# Patient Record
Sex: FEMALE | Race: WHITE | URBAN - METROPOLITAN AREA
[De-identification: names, ages, dates, MRNs, and addresses within clinical notes are randomized per-mention and may not be internally consistent; named-entity substitution may affect disease eponyms.]

---

## 2019-03-07 ENCOUNTER — APPOINTMENT (RX ONLY)
Dept: URBAN - METROPOLITAN AREA CLINIC 35 | Facility: CLINIC | Age: 64
Setting detail: DERMATOLOGY
End: 2019-03-07

## 2019-03-07 DIAGNOSIS — Z86.007 PERSONAL HISTORY OF IN-SITU NEOPLASM OF SKIN: ICD-10-CM

## 2019-03-07 DIAGNOSIS — D22 MELANOCYTIC NEVI: ICD-10-CM

## 2019-03-07 DIAGNOSIS — L82.1 OTHER SEBORRHEIC KERATOSIS: ICD-10-CM

## 2019-03-07 DIAGNOSIS — Z71.89 OTHER SPECIFIED COUNSELING: ICD-10-CM

## 2019-03-07 DIAGNOSIS — L81.4 OTHER MELANIN HYPERPIGMENTATION: ICD-10-CM

## 2019-03-07 DIAGNOSIS — Z87.2 PERSONAL HISTORY OF DISEASES OF THE SKIN AND SUBCUTANEOUS TISSUE: ICD-10-CM

## 2019-03-07 PROBLEM — I10 ESSENTIAL (PRIMARY) HYPERTENSION: Status: ACTIVE | Noted: 2019-03-07

## 2019-03-07 PROBLEM — D22.61 MELANOCYTIC NEVI OF RIGHT UPPER LIMB, INCLUDING SHOULDER: Status: ACTIVE | Noted: 2019-03-07

## 2019-03-07 PROBLEM — Z85.828 PERSONAL HISTORY OF OTHER MALIGNANT NEOPLASM OF SKIN: Status: ACTIVE | Noted: 2019-03-07

## 2019-03-07 PROBLEM — D22.71 MELANOCYTIC NEVI OF RIGHT LOWER LIMB, INCLUDING HIP: Status: ACTIVE | Noted: 2019-03-07

## 2019-03-07 PROCEDURE — 99213 OFFICE O/P EST LOW 20 MIN: CPT

## 2019-03-07 PROCEDURE — ? COUNSELING

## 2019-03-07 ASSESSMENT — LOCATION SIMPLE DESCRIPTION DERM
LOCATION SIMPLE: LEFT UPPER BACK
LOCATION SIMPLE: RIGHT SHOULDER
LOCATION SIMPLE: RIGHT UPPER BACK
LOCATION SIMPLE: RIGHT PLANTAR SURFACE

## 2019-03-07 ASSESSMENT — LOCATION ZONE DERM
LOCATION ZONE: TRUNK
LOCATION ZONE: ARM
LOCATION ZONE: FEET

## 2019-03-07 ASSESSMENT — LOCATION DETAILED DESCRIPTION DERM
LOCATION DETAILED: RIGHT SUPERIOR UPPER BACK
LOCATION DETAILED: RIGHT POSTERIOR SHOULDER
LOCATION DETAILED: LEFT SUPERIOR MEDIAL UPPER BACK
LOCATION DETAILED: RIGHT PLANTAR FOREFOOT OVERLYING 1ST METATARSAL

## 2020-01-17 ENCOUNTER — HOSPITAL ENCOUNTER (OUTPATIENT)
Dept: HOSPITAL 8 - RAD | Age: 65
Discharge: HOME | End: 2020-01-17
Attending: PHYSICIAN ASSISTANT
Payer: COMMERCIAL

## 2020-01-17 DIAGNOSIS — Z95.810: ICD-10-CM

## 2020-01-17 DIAGNOSIS — R00.1: ICD-10-CM

## 2020-01-17 DIAGNOSIS — J98.4: Primary | ICD-10-CM

## 2020-01-17 DIAGNOSIS — E78.2: ICD-10-CM

## 2020-01-17 DIAGNOSIS — I42.9: ICD-10-CM

## 2020-01-17 PROCEDURE — 71046 X-RAY EXAM CHEST 2 VIEWS: CPT

## 2020-03-11 ENCOUNTER — APPOINTMENT (RX ONLY)
Dept: URBAN - METROPOLITAN AREA CLINIC 35 | Facility: CLINIC | Age: 65
Setting detail: DERMATOLOGY
End: 2020-03-11

## 2020-03-11 DIAGNOSIS — H61.03 CHONDRITIS OF EXTERNAL EAR: ICD-10-CM

## 2020-03-11 DIAGNOSIS — Z86.007 PERSONAL HISTORY OF IN-SITU NEOPLASM OF SKIN: ICD-10-CM

## 2020-03-11 DIAGNOSIS — L81.4 OTHER MELANIN HYPERPIGMENTATION: ICD-10-CM

## 2020-03-11 DIAGNOSIS — Z71.89 OTHER SPECIFIED COUNSELING: ICD-10-CM

## 2020-03-11 DIAGNOSIS — D22 MELANOCYTIC NEVI: ICD-10-CM

## 2020-03-11 DIAGNOSIS — L82.1 OTHER SEBORRHEIC KERATOSIS: ICD-10-CM

## 2020-03-11 DIAGNOSIS — Z87.2 PERSONAL HISTORY OF DISEASES OF THE SKIN AND SUBCUTANEOUS TISSUE: ICD-10-CM

## 2020-03-11 PROBLEM — Z85.828 PERSONAL HISTORY OF OTHER MALIGNANT NEOPLASM OF SKIN: Status: ACTIVE | Noted: 2020-03-11

## 2020-03-11 PROBLEM — D22.9 MELANOCYTIC NEVI, UNSPECIFIED: Status: ACTIVE | Noted: 2020-03-11

## 2020-03-11 PROBLEM — H61.032 CHONDRITIS OF LEFT EXTERNAL EAR: Status: ACTIVE | Noted: 2020-03-11

## 2020-03-11 PROCEDURE — 99213 OFFICE O/P EST LOW 20 MIN: CPT

## 2020-03-11 PROCEDURE — ? COUNSELING

## 2020-03-11 ASSESSMENT — LOCATION ZONE DERM
LOCATION ZONE: EAR
LOCATION ZONE: TRUNK

## 2020-03-11 ASSESSMENT — LOCATION SIMPLE DESCRIPTION DERM
LOCATION SIMPLE: LEFT EAR
LOCATION SIMPLE: LEFT UPPER BACK
LOCATION SIMPLE: RIGHT UPPER BACK

## 2020-03-11 ASSESSMENT — LOCATION DETAILED DESCRIPTION DERM
LOCATION DETAILED: LEFT INFERIOR HELIX
LOCATION DETAILED: LEFT SUPERIOR MEDIAL UPPER BACK
LOCATION DETAILED: RIGHT SUPERIOR UPPER BACK

## 2020-03-14 ENCOUNTER — HOSPITAL ENCOUNTER (OUTPATIENT)
Dept: HOSPITAL 8 - LAB | Age: 65
Discharge: HOME | End: 2020-03-14
Attending: PHYSICIAN ASSISTANT
Payer: COMMERCIAL

## 2020-03-14 DIAGNOSIS — E04.1: ICD-10-CM

## 2020-03-14 DIAGNOSIS — I42.9: ICD-10-CM

## 2020-03-14 DIAGNOSIS — E78.5: ICD-10-CM

## 2020-03-14 DIAGNOSIS — C44.92: ICD-10-CM

## 2020-03-14 DIAGNOSIS — Z12.4: Primary | ICD-10-CM

## 2020-03-14 DIAGNOSIS — M81.8: ICD-10-CM

## 2020-03-14 DIAGNOSIS — Z13.220: ICD-10-CM

## 2020-03-14 DIAGNOSIS — J45.909: ICD-10-CM

## 2020-03-14 DIAGNOSIS — K21.9: ICD-10-CM

## 2020-03-14 DIAGNOSIS — R73.01: ICD-10-CM

## 2020-03-14 LAB
ALBUMIN SERPL-MCNC: 3.6 G/DL (ref 3.4–5)
ALP SERPL-CCNC: 78 U/L (ref 45–117)
ALT SERPL-CCNC: 23 U/L (ref 12–78)
ANION GAP SERPL CALC-SCNC: 7 MMOL/L (ref 5–15)
BASOPHILS # BLD AUTO: 0.08 X10^3/UL (ref 0–0.1)
BASOPHILS NFR BLD AUTO: 1 % (ref 0–1)
BILIRUB SERPL-MCNC: 0.8 MG/DL (ref 0.2–1)
CALCIUM SERPL-MCNC: 8.6 MG/DL (ref 8.5–10.1)
CHLORIDE SERPL-SCNC: 107 MMOL/L (ref 98–107)
CHOL/HDL RATIO: 3.6
CREAT SERPL-MCNC: 0.84 MG/DL (ref 0.55–1.02)
EOSINOPHIL # BLD AUTO: 0.12 X10^3/UL (ref 0–0.4)
EOSINOPHIL NFR BLD AUTO: 1 % (ref 1–7)
ERYTHROCYTE [DISTWIDTH] IN BLOOD BY AUTOMATED COUNT: 13.3 % (ref 9.6–15.2)
EST. AVERAGE GLUCOSE BLD GHB EST-MCNC: 131 MG/DL (ref 0–126)
HDL CHOL %: 28 % (ref 28–40)
HDL CHOLESTEROL (DIRECT): 46 MG/DL (ref 40–60)
LDL CHOLESTEROL,CALCULATED: 91 MG/DL (ref 54–169)
LDLC/HDLC SERPL: 2 {RATIO} (ref 0.5–3)
LYMPHOCYTES # BLD AUTO: 1.92 X10^3/UL (ref 1–3.4)
LYMPHOCYTES NFR BLD AUTO: 22 % (ref 22–44)
MCH RBC QN AUTO: 31.2 PG (ref 27–34.8)
MCHC RBC AUTO-ENTMCNC: 33.4 G/DL (ref 32.4–35.8)
MCV RBC AUTO: 93.4 FL (ref 80–100)
MD: NO
MONOCYTES # BLD AUTO: 0.67 X10^3/UL (ref 0.2–0.8)
MONOCYTES NFR BLD AUTO: 8 % (ref 2–9)
NEUTROPHILS # BLD AUTO: 5.78 X10^3/UL (ref 1.8–6.8)
NEUTROPHILS NFR BLD AUTO: 67 % (ref 42–75)
PLATELET # BLD AUTO: 311 X10^3/UL (ref 130–400)
PMV BLD AUTO: 8.4 FL (ref 7.4–10.4)
PROT SERPL-MCNC: 7.1 G/DL (ref 6.4–8.2)
RBC # BLD AUTO: 4.77 X10^6/UL (ref 3.82–5.3)
T4 (THYROXINE): 11.2 MCG/DL (ref 4.8–13.9)
TRIGL SERPL-MCNC: 133 MG/DL (ref 50–200)
VLDLC SERPL CALC-MCNC: 27 MG/DL (ref 0–25)

## 2020-03-14 PROCEDURE — 36415 COLL VENOUS BLD VENIPUNCTURE: CPT

## 2020-03-14 PROCEDURE — 85025 COMPLETE CBC W/AUTO DIFF WBC: CPT

## 2020-03-14 PROCEDURE — 84443 ASSAY THYROID STIM HORMONE: CPT

## 2020-03-14 PROCEDURE — 83036 HEMOGLOBIN GLYCOSYLATED A1C: CPT

## 2020-03-14 PROCEDURE — 84481 FREE ASSAY (FT-3): CPT

## 2020-03-14 PROCEDURE — 80061 LIPID PANEL: CPT

## 2020-03-14 PROCEDURE — 84436 ASSAY OF TOTAL THYROXINE: CPT

## 2020-03-14 PROCEDURE — 80053 COMPREHEN METABOLIC PANEL: CPT

## 2020-04-17 ENCOUNTER — HOSPITAL ENCOUNTER (OUTPATIENT)
Dept: HOSPITAL 8 - CVU | Age: 65
Discharge: HOME | End: 2020-04-17
Attending: INTERNAL MEDICINE
Payer: COMMERCIAL

## 2020-04-17 DIAGNOSIS — I42.9: ICD-10-CM

## 2020-04-17 DIAGNOSIS — I08.3: Primary | ICD-10-CM

## 2020-04-17 PROCEDURE — 93306 TTE W/DOPPLER COMPLETE: CPT

## 2020-08-06 ENCOUNTER — HOSPITAL ENCOUNTER (OUTPATIENT)
Dept: HOSPITAL 8 - LAB | Age: 65
Discharge: HOME | End: 2020-08-06
Attending: INTERNAL MEDICINE
Payer: COMMERCIAL

## 2020-08-06 DIAGNOSIS — I50.9: Primary | ICD-10-CM

## 2020-08-06 LAB
ANION GAP SERPL CALC-SCNC: 7 MMOL/L (ref 5–15)
CALCIUM SERPL-MCNC: 8.5 MG/DL (ref 8.5–10.1)
CHLORIDE SERPL-SCNC: 110 MMOL/L (ref 98–107)
CREAT SERPL-MCNC: 0.77 MG/DL (ref 0.55–1.02)

## 2020-08-06 PROCEDURE — 36415 COLL VENOUS BLD VENIPUNCTURE: CPT

## 2020-08-06 PROCEDURE — 80048 BASIC METABOLIC PNL TOTAL CA: CPT

## 2020-09-12 ENCOUNTER — HOSPITAL ENCOUNTER (OUTPATIENT)
Dept: HOSPITAL 8 - LAB | Age: 65
Discharge: HOME | End: 2020-09-12
Attending: INTERNAL MEDICINE
Payer: COMMERCIAL

## 2020-09-12 DIAGNOSIS — I42.9: ICD-10-CM

## 2020-09-12 DIAGNOSIS — R73.01: ICD-10-CM

## 2020-09-12 DIAGNOSIS — Z12.11: ICD-10-CM

## 2020-09-12 DIAGNOSIS — Z13.220: Primary | ICD-10-CM

## 2020-09-12 DIAGNOSIS — E04.1: ICD-10-CM

## 2020-09-12 DIAGNOSIS — C44.92: ICD-10-CM

## 2020-09-12 DIAGNOSIS — Z12.39: ICD-10-CM

## 2020-09-12 DIAGNOSIS — J45.909: ICD-10-CM

## 2020-09-12 DIAGNOSIS — K21.9: ICD-10-CM

## 2020-09-12 DIAGNOSIS — E78.5: ICD-10-CM

## 2020-09-12 DIAGNOSIS — Z12.4: ICD-10-CM

## 2020-09-12 DIAGNOSIS — M81.8: ICD-10-CM

## 2020-09-12 LAB
ALBUMIN SERPL-MCNC: 3.6 G/DL (ref 3.4–5)
ALP SERPL-CCNC: 68 U/L (ref 45–117)
ALT SERPL-CCNC: 21 U/L (ref 12–78)
ANION GAP SERPL CALC-SCNC: 4 MMOL/L (ref 5–15)
BASOPHILS # BLD AUTO: 0.02 X10^3/UL (ref 0–0.1)
BASOPHILS NFR BLD AUTO: 0 % (ref 0–1)
BILIRUB SERPL-MCNC: 0.8 MG/DL (ref 0.2–1)
CALCIUM SERPL-MCNC: 9 MG/DL (ref 8.5–10.1)
CHLORIDE SERPL-SCNC: 110 MMOL/L (ref 98–107)
CHOL/HDL RATIO: 3.2
CREAT SERPL-MCNC: 0.79 MG/DL (ref 0.55–1.02)
EOSINOPHIL # BLD AUTO: 0.14 X10^3/UL (ref 0–0.4)
EOSINOPHIL NFR BLD AUTO: 2 % (ref 1–7)
ERYTHROCYTE [DISTWIDTH] IN BLOOD BY AUTOMATED COUNT: 13.1 % (ref 9.6–15.2)
EST. AVERAGE GLUCOSE BLD GHB EST-MCNC: 123 MG/DL (ref 0–126)
HDL CHOL %: 31 % (ref 28–40)
HDL CHOLESTEROL (DIRECT): 45 MG/DL (ref 40–60)
LDL CHOLESTEROL,CALCULATED: 66 MG/DL (ref 54–169)
LDLC/HDLC SERPL: 1.5 {RATIO} (ref 0.5–3)
LYMPHOCYTES # BLD AUTO: 1.95 X10^3/UL (ref 1–3.4)
LYMPHOCYTES NFR BLD AUTO: 21 % (ref 22–44)
MCH RBC QN AUTO: 30.9 PG (ref 27–34.8)
MCHC RBC AUTO-ENTMCNC: 33.2 G/DL (ref 32.4–35.8)
MCV RBC AUTO: 93.1 FL (ref 80–100)
MD: NO
MONOCYTES # BLD AUTO: 0.55 X10^3/UL (ref 0.2–0.8)
MONOCYTES NFR BLD AUTO: 6 % (ref 2–9)
NEUTROPHILS # BLD AUTO: 6.75 X10^3/UL (ref 1.8–6.8)
NEUTROPHILS NFR BLD AUTO: 72 % (ref 42–75)
PLATELET # BLD AUTO: 279 X10^3/UL (ref 130–400)
PMV BLD AUTO: 8.4 FL (ref 7.4–10.4)
PROT SERPL-MCNC: 7 G/DL (ref 6.4–8.2)
RBC # BLD AUTO: 4.59 X10^6/UL (ref 3.82–5.3)
TRIGL SERPL-MCNC: 176 MG/DL (ref 50–200)
VLDLC SERPL CALC-MCNC: 35 MG/DL (ref 0–25)

## 2020-09-12 PROCEDURE — 80053 COMPREHEN METABOLIC PANEL: CPT

## 2020-09-12 PROCEDURE — 84443 ASSAY THYROID STIM HORMONE: CPT

## 2020-09-12 PROCEDURE — 80061 LIPID PANEL: CPT

## 2020-09-12 PROCEDURE — 85025 COMPLETE CBC W/AUTO DIFF WBC: CPT

## 2020-09-12 PROCEDURE — 83036 HEMOGLOBIN GLYCOSYLATED A1C: CPT

## 2020-09-12 PROCEDURE — 36415 COLL VENOUS BLD VENIPUNCTURE: CPT

## 2020-10-12 ENCOUNTER — HOSPITAL ENCOUNTER (OUTPATIENT)
Dept: HOSPITAL 8 - RAD | Age: 65
Discharge: HOME | End: 2020-10-12
Attending: INTERNAL MEDICINE
Payer: COMMERCIAL

## 2020-10-12 DIAGNOSIS — R60.9: ICD-10-CM

## 2020-10-12 DIAGNOSIS — M79.662: ICD-10-CM

## 2020-10-12 DIAGNOSIS — I82.409: Primary | ICD-10-CM

## 2020-10-19 ENCOUNTER — HOSPITAL ENCOUNTER (OUTPATIENT)
Dept: HOSPITAL 8 - LAB | Age: 65
Discharge: HOME | End: 2020-10-19
Attending: INTERNAL MEDICINE
Payer: COMMERCIAL

## 2020-10-19 DIAGNOSIS — Z95.810: ICD-10-CM

## 2020-10-19 DIAGNOSIS — E78.2: Primary | ICD-10-CM

## 2020-10-19 DIAGNOSIS — I42.9: ICD-10-CM

## 2020-10-19 LAB
ANION GAP SERPL CALC-SCNC: 5 MMOL/L (ref 5–15)
CALCIUM SERPL-MCNC: 9 MG/DL (ref 8.5–10.1)
CHLORIDE SERPL-SCNC: 106 MMOL/L (ref 98–107)
CREAT SERPL-MCNC: 0.69 MG/DL (ref 0.55–1.02)

## 2020-10-19 PROCEDURE — 80048 BASIC METABOLIC PNL TOTAL CA: CPT

## 2020-10-19 PROCEDURE — 36415 COLL VENOUS BLD VENIPUNCTURE: CPT

## 2020-12-11 ENCOUNTER — HOSPITAL ENCOUNTER (OUTPATIENT)
Dept: HOSPITAL 8 - LAB | Age: 65
Discharge: HOME | End: 2020-12-11
Attending: INTERNAL MEDICINE
Payer: COMMERCIAL

## 2020-12-11 DIAGNOSIS — Z13.220: Primary | ICD-10-CM

## 2020-12-11 DIAGNOSIS — M81.8: ICD-10-CM

## 2020-12-11 DIAGNOSIS — I42.9: ICD-10-CM

## 2020-12-11 DIAGNOSIS — Z12.4: ICD-10-CM

## 2020-12-11 DIAGNOSIS — Z23: ICD-10-CM

## 2020-12-11 DIAGNOSIS — C44.92: ICD-10-CM

## 2020-12-11 DIAGNOSIS — K21.9: ICD-10-CM

## 2020-12-11 DIAGNOSIS — Z12.11: ICD-10-CM

## 2020-12-11 DIAGNOSIS — R73.01: ICD-10-CM

## 2020-12-11 DIAGNOSIS — E04.1: ICD-10-CM

## 2020-12-11 DIAGNOSIS — E78.5: ICD-10-CM

## 2020-12-11 DIAGNOSIS — J45.909: ICD-10-CM

## 2020-12-11 LAB
ANION GAP SERPL CALC-SCNC: 4 MMOL/L (ref 5–15)
CALCIUM SERPL-MCNC: 9 MG/DL (ref 8.5–10.1)
CHLORIDE SERPL-SCNC: 106 MMOL/L (ref 98–107)
CREAT SERPL-MCNC: 0.9 MG/DL (ref 0.55–1.02)

## 2020-12-11 PROCEDURE — 36415 COLL VENOUS BLD VENIPUNCTURE: CPT

## 2020-12-11 PROCEDURE — 80048 BASIC METABOLIC PNL TOTAL CA: CPT

## 2020-12-28 ENCOUNTER — HOSPITAL ENCOUNTER (OUTPATIENT)
Dept: HOSPITAL 8 - CFH | Age: 65
Discharge: HOME | End: 2020-12-28
Attending: INTERNAL MEDICINE
Payer: COMMERCIAL

## 2020-12-28 DIAGNOSIS — N63.20: ICD-10-CM

## 2020-12-28 DIAGNOSIS — Z12.31: Primary | ICD-10-CM

## 2020-12-28 DIAGNOSIS — N63.10: ICD-10-CM

## 2020-12-28 PROCEDURE — 77063 BREAST TOMOSYNTHESIS BI: CPT

## 2020-12-28 PROCEDURE — 77067 SCR MAMMO BI INCL CAD: CPT

## 2021-01-29 ENCOUNTER — HOSPITAL ENCOUNTER (OUTPATIENT)
Dept: HOSPITAL 8 - RAD | Age: 66
Discharge: HOME | End: 2021-01-29
Attending: INTERNAL MEDICINE
Payer: COMMERCIAL

## 2021-01-29 DIAGNOSIS — I42.9: ICD-10-CM

## 2021-01-29 DIAGNOSIS — E78.2: Primary | ICD-10-CM

## 2021-01-29 DIAGNOSIS — I51.7: ICD-10-CM

## 2021-01-29 DIAGNOSIS — R60.9: ICD-10-CM

## 2021-01-29 DIAGNOSIS — I49.3: ICD-10-CM

## 2021-01-29 DIAGNOSIS — J84.10: ICD-10-CM

## 2021-01-29 DIAGNOSIS — Z95.810: ICD-10-CM

## 2021-01-29 PROCEDURE — 71046 X-RAY EXAM CHEST 2 VIEWS: CPT

## 2021-02-25 ENCOUNTER — HOSPITAL ENCOUNTER (OUTPATIENT)
Dept: HOSPITAL 8 - CVU | Age: 66
Discharge: HOME | End: 2021-02-25
Attending: INTERNAL MEDICINE
Payer: COMMERCIAL

## 2021-02-25 DIAGNOSIS — I35.1: Primary | ICD-10-CM

## 2021-02-25 DIAGNOSIS — I86.8: ICD-10-CM

## 2021-02-25 DIAGNOSIS — I42.9: ICD-10-CM

## 2021-02-25 PROCEDURE — 93306 TTE W/DOPPLER COMPLETE: CPT

## 2021-02-25 PROCEDURE — 93970 EXTREMITY STUDY: CPT

## 2021-03-03 DIAGNOSIS — Z23 NEED FOR VACCINATION: ICD-10-CM

## 2021-03-10 ENCOUNTER — HOSPITAL ENCOUNTER (OUTPATIENT)
Dept: HOSPITAL 8 - RAD | Age: 66
Discharge: HOME | End: 2021-03-10
Attending: NURSE PRACTITIONER
Payer: COMMERCIAL

## 2021-03-10 DIAGNOSIS — E04.2: Primary | ICD-10-CM

## 2021-03-10 PROCEDURE — 76536 US EXAM OF HEAD AND NECK: CPT

## 2021-03-11 ENCOUNTER — APPOINTMENT (RX ONLY)
Dept: URBAN - METROPOLITAN AREA CLINIC 35 | Facility: CLINIC | Age: 66
Setting detail: DERMATOLOGY
End: 2021-03-11

## 2021-03-11 DIAGNOSIS — Z71.89 OTHER SPECIFIED COUNSELING: ICD-10-CM

## 2021-03-11 DIAGNOSIS — L82.0 INFLAMED SEBORRHEIC KERATOSIS: ICD-10-CM

## 2021-03-11 DIAGNOSIS — Z87.2 PERSONAL HISTORY OF DISEASES OF THE SKIN AND SUBCUTANEOUS TISSUE: ICD-10-CM

## 2021-03-11 DIAGNOSIS — L81.4 OTHER MELANIN HYPERPIGMENTATION: ICD-10-CM

## 2021-03-11 DIAGNOSIS — D22 MELANOCYTIC NEVI: ICD-10-CM

## 2021-03-11 DIAGNOSIS — Z86.007 PERSONAL HISTORY OF IN-SITU NEOPLASM OF SKIN: ICD-10-CM

## 2021-03-11 DIAGNOSIS — L72.0 EPIDERMAL CYST: ICD-10-CM

## 2021-03-11 DIAGNOSIS — L82.1 OTHER SEBORRHEIC KERATOSIS: ICD-10-CM

## 2021-03-11 DIAGNOSIS — D485 NEOPLASM OF UNCERTAIN BEHAVIOR OF SKIN: ICD-10-CM

## 2021-03-11 PROBLEM — Z85.828 PERSONAL HISTORY OF OTHER MALIGNANT NEOPLASM OF SKIN: Status: ACTIVE | Noted: 2021-03-11

## 2021-03-11 PROBLEM — D48.5 NEOPLASM OF UNCERTAIN BEHAVIOR OF SKIN: Status: ACTIVE | Noted: 2021-03-11

## 2021-03-11 PROBLEM — D22.5 MELANOCYTIC NEVI OF TRUNK: Status: ACTIVE | Noted: 2021-03-11

## 2021-03-11 PROCEDURE — 99213 OFFICE O/P EST LOW 20 MIN: CPT | Mod: 25

## 2021-03-11 PROCEDURE — ? COUNSELING

## 2021-03-11 PROCEDURE — 69100 BIOPSY OF EXTERNAL EAR: CPT

## 2021-03-11 PROCEDURE — ? BIOPSY BY SHAVE METHOD

## 2021-03-11 ASSESSMENT — LOCATION DETAILED DESCRIPTION DERM
LOCATION DETAILED: RIGHT MID-UPPER BACK
LOCATION DETAILED: RIGHT CLAVICULAR NECK
LOCATION DETAILED: LEFT CLAVICULAR NECK
LOCATION DETAILED: RIGHT SUPERIOR UPPER BACK
LOCATION DETAILED: LEFT SUPERIOR MEDIAL UPPER BACK
LOCATION DETAILED: LEFT ANTIHELIX
LOCATION DETAILED: XIPHOID

## 2021-03-11 ASSESSMENT — LOCATION SIMPLE DESCRIPTION DERM
LOCATION SIMPLE: LEFT UPPER BACK
LOCATION SIMPLE: RIGHT UPPER BACK
LOCATION SIMPLE: LEFT EAR
LOCATION SIMPLE: RIGHT ANTERIOR NECK
LOCATION SIMPLE: ABDOMEN
LOCATION SIMPLE: LEFT ANTERIOR NECK

## 2021-03-11 ASSESSMENT — LOCATION ZONE DERM
LOCATION ZONE: TRUNK
LOCATION ZONE: EAR
LOCATION ZONE: NECK

## 2021-03-13 ENCOUNTER — HOSPITAL ENCOUNTER (OUTPATIENT)
Dept: HOSPITAL 8 - LAB | Age: 66
Discharge: HOME | End: 2021-03-13
Attending: INTERNAL MEDICINE
Payer: COMMERCIAL

## 2021-03-13 DIAGNOSIS — Z23: ICD-10-CM

## 2021-03-13 DIAGNOSIS — E78.2: ICD-10-CM

## 2021-03-13 DIAGNOSIS — R60.9: ICD-10-CM

## 2021-03-13 DIAGNOSIS — Z95.810: ICD-10-CM

## 2021-03-13 DIAGNOSIS — E04.1: ICD-10-CM

## 2021-03-13 DIAGNOSIS — E11.9: ICD-10-CM

## 2021-03-13 DIAGNOSIS — Z13.220: Primary | ICD-10-CM

## 2021-03-13 DIAGNOSIS — I49.3: ICD-10-CM

## 2021-03-13 DIAGNOSIS — I42.9: ICD-10-CM

## 2021-03-13 DIAGNOSIS — M81.8: ICD-10-CM

## 2021-03-13 DIAGNOSIS — R07.9: ICD-10-CM

## 2021-03-13 DIAGNOSIS — I10: ICD-10-CM

## 2021-03-13 DIAGNOSIS — Z12.4: ICD-10-CM

## 2021-03-13 DIAGNOSIS — E78.5: ICD-10-CM

## 2021-03-13 DIAGNOSIS — J45.909: ICD-10-CM

## 2021-03-13 DIAGNOSIS — K21.9: ICD-10-CM

## 2021-03-13 DIAGNOSIS — C44.92: ICD-10-CM

## 2021-03-13 LAB
ALBUMIN SERPL-MCNC: 3.8 G/DL (ref 3.4–5)
ALP SERPL-CCNC: 67 U/L (ref 45–117)
ALT SERPL-CCNC: 21 U/L (ref 12–78)
ANION GAP SERPL CALC-SCNC: 5 MMOL/L (ref 5–15)
BASOPHILS # BLD AUTO: 0.1 X10^3/UL (ref 0–0.1)
BASOPHILS NFR BLD AUTO: 1 % (ref 0–1)
BILIRUB SERPL-MCNC: 0.9 MG/DL (ref 0.2–1)
CALCIUM SERPL-MCNC: 8.6 MG/DL (ref 8.5–10.1)
CHLORIDE SERPL-SCNC: 107 MMOL/L (ref 98–107)
CHOL/HDL RATIO: 3.4
CREAT SERPL-MCNC: 0.86 MG/DL (ref 0.55–1.02)
EOSINOPHIL # BLD AUTO: 0.1 X10^3/UL (ref 0–0.4)
EOSINOPHIL NFR BLD AUTO: 1 % (ref 1–7)
ERYTHROCYTE [DISTWIDTH] IN BLOOD BY AUTOMATED COUNT: 13.1 % (ref 9.6–15.2)
EST. AVERAGE GLUCOSE BLD GHB EST-MCNC: 131 MG/DL (ref 0–126)
HDL CHOL %: 30 % (ref 28–40)
HDL CHOLESTEROL (DIRECT): 44 MG/DL (ref 40–60)
LDL CHOLESTEROL,CALCULATED: 65 MG/DL (ref 54–169)
LDLC/HDLC SERPL: 1.5 {RATIO} (ref 0.5–3)
LYMPHOCYTES # BLD AUTO: 1.9 X10^3/UL (ref 1–3.4)
LYMPHOCYTES NFR BLD AUTO: 19 % (ref 22–44)
MCH RBC QN AUTO: 32 PG (ref 27–34.8)
MCHC RBC AUTO-ENTMCNC: 34.2 G/DL (ref 32.4–35.8)
MD: NO
MONOCYTES # BLD AUTO: 0.8 X10^3/UL (ref 0.2–0.8)
MONOCYTES NFR BLD AUTO: 8 % (ref 2–9)
NEUTROPHILS # BLD AUTO: 7.2 X10^3/UL (ref 1.8–6.8)
NEUTROPHILS NFR BLD AUTO: 71 % (ref 42–75)
PLATELET # BLD AUTO: 318 X10^3/UL (ref 130–400)
PMV BLD AUTO: 8.4 FL (ref 7.4–10.4)
PROT SERPL-MCNC: 7.3 G/DL (ref 6.4–8.2)
RBC # BLD AUTO: 4.33 X10^6/UL (ref 3.82–5.3)
TRIGL SERPL-MCNC: 198 MG/DL (ref 50–200)
VLDLC SERPL CALC-MCNC: 40 MG/DL (ref 0–25)

## 2021-03-13 PROCEDURE — 83036 HEMOGLOBIN GLYCOSYLATED A1C: CPT

## 2021-03-13 PROCEDURE — 80061 LIPID PANEL: CPT

## 2021-03-13 PROCEDURE — 85025 COMPLETE CBC W/AUTO DIFF WBC: CPT

## 2021-03-13 PROCEDURE — 84443 ASSAY THYROID STIM HORMONE: CPT

## 2021-03-13 PROCEDURE — 83880 ASSAY OF NATRIURETIC PEPTIDE: CPT

## 2021-03-13 PROCEDURE — 36415 COLL VENOUS BLD VENIPUNCTURE: CPT

## 2021-03-13 PROCEDURE — 80053 COMPREHEN METABOLIC PANEL: CPT

## 2021-03-15 ENCOUNTER — APPOINTMENT (RX ONLY)
Dept: URBAN - METROPOLITAN AREA CLINIC 35 | Facility: CLINIC | Age: 66
Setting detail: DERMATOLOGY
End: 2021-03-15

## 2021-03-15 DIAGNOSIS — L82.1 OTHER SEBORRHEIC KERATOSIS: ICD-10-CM

## 2021-03-15 PROCEDURE — ? BENIGN DESTRUCTION COSMETIC

## 2021-03-15 ASSESSMENT — LOCATION DETAILED DESCRIPTION DERM
LOCATION DETAILED: RIGHT CLAVICULAR NECK
LOCATION DETAILED: RIGHT INFERIOR LATERAL NECK
LOCATION DETAILED: LEFT CLAVICULAR NECK
LOCATION DETAILED: LEFT INFERIOR LATERAL NECK

## 2021-03-15 ASSESSMENT — LOCATION ZONE DERM: LOCATION ZONE: NECK

## 2021-03-15 ASSESSMENT — LOCATION SIMPLE DESCRIPTION DERM
LOCATION SIMPLE: RIGHT ANTERIOR NECK
LOCATION SIMPLE: LEFT ANTERIOR NECK

## 2021-03-15 NOTE — PROCEDURE: BENIGN DESTRUCTION COSMETIC
Anesthesia Volume In Cc: 1
Detail Level: Simple
Anesthesia Type: 0.5% lidocaine with 1:200,000 epinephrine and a 1:10 solution of 8.4% sodium bicarbonate
Post-Care Instructions: I reviewed with the patient in detail post-care instructions. Patient is to wear sunprotection, and avoid picking at any of the treated lesions. Pt may apply Vaseline to crusted or scabbing areas.
Consent: The patient's consent was obtained including but not limited to risks of crusting, scabbing, blistering, scarring, darker or lighter pigmentary change, recurrence, incomplete removal and infection.
Price (Use Numbers Only, No Special Characters Or $): 125.00

## 2021-04-27 ENCOUNTER — HOSPITAL ENCOUNTER (OUTPATIENT)
Dept: HOSPITAL 8 - RAD | Age: 66
Discharge: HOME | End: 2021-04-27
Attending: NURSE PRACTITIONER
Payer: COMMERCIAL

## 2021-04-27 DIAGNOSIS — M47.814: ICD-10-CM

## 2021-04-27 DIAGNOSIS — R07.1: ICD-10-CM

## 2021-04-27 DIAGNOSIS — M54.9: ICD-10-CM

## 2021-04-27 DIAGNOSIS — M41.84: Primary | ICD-10-CM

## 2021-04-27 PROCEDURE — 72072 X-RAY EXAM THORAC SPINE 3VWS: CPT

## 2021-04-27 PROCEDURE — 71046 X-RAY EXAM CHEST 2 VIEWS: CPT

## 2021-09-11 ENCOUNTER — HOSPITAL ENCOUNTER (OUTPATIENT)
Dept: HOSPITAL 8 - LAB | Age: 66
Discharge: HOME | End: 2021-09-11
Attending: INTERNAL MEDICINE
Payer: COMMERCIAL

## 2021-09-11 DIAGNOSIS — J45.909: ICD-10-CM

## 2021-09-11 DIAGNOSIS — M54.9: ICD-10-CM

## 2021-09-11 DIAGNOSIS — Z79.899: ICD-10-CM

## 2021-09-11 DIAGNOSIS — R79.9: ICD-10-CM

## 2021-09-11 DIAGNOSIS — E88.81: ICD-10-CM

## 2021-09-11 DIAGNOSIS — K21.9: ICD-10-CM

## 2021-09-11 DIAGNOSIS — E78.2: ICD-10-CM

## 2021-09-11 DIAGNOSIS — I10: ICD-10-CM

## 2021-09-11 DIAGNOSIS — E11.9: ICD-10-CM

## 2021-09-11 DIAGNOSIS — R01.1: ICD-10-CM

## 2021-09-11 DIAGNOSIS — E04.1: ICD-10-CM

## 2021-09-11 DIAGNOSIS — M81.8: ICD-10-CM

## 2021-09-11 DIAGNOSIS — I42.9: Primary | ICD-10-CM

## 2021-09-11 LAB
ALBUMIN SERPL-MCNC: 3.6 G/DL (ref 3.4–5)
ALP SERPL-CCNC: 88 U/L (ref 45–117)
ALT SERPL-CCNC: 23 U/L (ref 12–78)
ANION GAP SERPL CALC-SCNC: 4 MMOL/L (ref 5–15)
BASOPHILS # BLD AUTO: 0 X10^3/UL (ref 0–0.1)
BASOPHILS NFR BLD AUTO: 1 % (ref 0–1)
BILIRUB SERPL-MCNC: 0.5 MG/DL (ref 0.2–1)
CALCIUM SERPL-MCNC: 8.8 MG/DL (ref 8.5–10.1)
CHLORIDE SERPL-SCNC: 110 MMOL/L (ref 98–107)
CHOL/HDL RATIO: 3.4
CREAT SERPL-MCNC: 0.76 MG/DL (ref 0.55–1.02)
EOSINOPHIL # BLD AUTO: 0.2 X10^3/UL (ref 0–0.4)
EOSINOPHIL NFR BLD AUTO: 3 % (ref 1–7)
ERYTHROCYTE [DISTWIDTH] IN BLOOD BY AUTOMATED COUNT: 12.6 % (ref 9.6–15.2)
EST. AVERAGE GLUCOSE BLD GHB EST-MCNC: 134 MG/DL (ref 0–126)
HDL CHOL %: 29 % (ref 28–40)
HDL CHOLESTEROL (DIRECT): 42 MG/DL (ref 40–60)
LDL CHOLESTEROL,CALCULATED: 79 MG/DL (ref 54–169)
LDLC/HDLC SERPL: 1.9 {RATIO} (ref 0.5–3)
LYMPHOCYTES # BLD AUTO: 1.6 X10^3/UL (ref 1–3.4)
LYMPHOCYTES NFR BLD AUTO: 20 % (ref 22–44)
MCH RBC QN AUTO: 31.1 PG (ref 27–34.8)
MCHC RBC AUTO-ENTMCNC: 33.7 G/DL (ref 32.4–35.8)
MONOCYTES # BLD AUTO: 0.6 X10^3/UL (ref 0.2–0.8)
MONOCYTES NFR BLD AUTO: 7 % (ref 2–9)
NEUTROPHILS # BLD AUTO: 5.4 X10^3/UL (ref 1.8–6.8)
NEUTROPHILS NFR BLD AUTO: 69 % (ref 42–75)
PLATELET # BLD AUTO: 295 X10^3/UL (ref 130–400)
PMV BLD AUTO: 8.1 FL (ref 7.4–10.4)
PROT SERPL-MCNC: 7.3 G/DL (ref 6.4–8.2)
RBC # BLD AUTO: 4.47 X10^6/UL (ref 3.82–5.3)
TRIGL SERPL-MCNC: 115 MG/DL (ref 50–200)
VLDLC SERPL CALC-MCNC: 23 MG/DL (ref 0–25)

## 2021-09-11 PROCEDURE — 80061 LIPID PANEL: CPT

## 2021-09-11 PROCEDURE — 85025 COMPLETE CBC W/AUTO DIFF WBC: CPT

## 2021-09-11 PROCEDURE — 83036 HEMOGLOBIN GLYCOSYLATED A1C: CPT

## 2021-09-11 PROCEDURE — 36415 COLL VENOUS BLD VENIPUNCTURE: CPT

## 2021-09-11 PROCEDURE — 80053 COMPREHEN METABOLIC PANEL: CPT

## 2021-09-11 PROCEDURE — 84443 ASSAY THYROID STIM HORMONE: CPT

## 2021-09-16 ENCOUNTER — HOSPITAL ENCOUNTER (OUTPATIENT)
Dept: HOSPITAL 8 - LAB | Age: 66
Discharge: HOME | End: 2021-09-16
Attending: INTERNAL MEDICINE
Payer: COMMERCIAL

## 2021-09-16 DIAGNOSIS — M54.9: Primary | ICD-10-CM

## 2021-09-16 LAB — MICROSCOPIC: (no result)

## 2021-09-16 PROCEDURE — 87086 URINE CULTURE/COLONY COUNT: CPT

## 2021-09-16 PROCEDURE — 81001 URINALYSIS AUTO W/SCOPE: CPT

## 2022-03-22 ENCOUNTER — APPOINTMENT (RX ONLY)
Dept: URBAN - METROPOLITAN AREA CLINIC 6 | Facility: CLINIC | Age: 67
Setting detail: DERMATOLOGY
End: 2022-03-22

## 2022-03-22 DIAGNOSIS — D17 BENIGN LIPOMATOUS NEOPLASM: ICD-10-CM

## 2022-03-22 DIAGNOSIS — Z87.2 PERSONAL HISTORY OF DISEASES OF THE SKIN AND SUBCUTANEOUS TISSUE: ICD-10-CM

## 2022-03-22 DIAGNOSIS — Z86.007 PERSONAL HISTORY OF IN-SITU NEOPLASM OF SKIN: ICD-10-CM

## 2022-03-22 DIAGNOSIS — H61.03 CHONDRITIS OF EXTERNAL EAR: ICD-10-CM

## 2022-03-22 DIAGNOSIS — Z71.89 OTHER SPECIFIED COUNSELING: ICD-10-CM

## 2022-03-22 DIAGNOSIS — D18.0 HEMANGIOMA: ICD-10-CM

## 2022-03-22 DIAGNOSIS — D22 MELANOCYTIC NEVI: ICD-10-CM

## 2022-03-22 DIAGNOSIS — L81.4 OTHER MELANIN HYPERPIGMENTATION: ICD-10-CM

## 2022-03-22 DIAGNOSIS — L82.1 OTHER SEBORRHEIC KERATOSIS: ICD-10-CM

## 2022-03-22 PROBLEM — D17.24 BENIGN LIPOMATOUS NEOPLASM OF SKIN AND SUBCUTANEOUS TISSUE OF LEFT LEG: Status: ACTIVE | Noted: 2022-03-22

## 2022-03-22 PROBLEM — D22.5 MELANOCYTIC NEVI OF TRUNK: Status: ACTIVE | Noted: 2022-03-22

## 2022-03-22 PROBLEM — D18.01 HEMANGIOMA OF SKIN AND SUBCUTANEOUS TISSUE: Status: ACTIVE | Noted: 2022-03-22

## 2022-03-22 PROBLEM — H61.032 CHONDRITIS OF LEFT EXTERNAL EAR: Status: ACTIVE | Noted: 2022-03-22

## 2022-03-22 PROBLEM — D23.61 OTHER BENIGN NEOPLASM OF SKIN OF RIGHT UPPER LIMB, INCLUDING SHOULDER: Status: ACTIVE | Noted: 2022-03-22

## 2022-03-22 PROCEDURE — ? TREATMENT REGIMEN

## 2022-03-22 PROCEDURE — ? SUNSCREEN RECOMMENDATIONS

## 2022-03-22 PROCEDURE — ? COUNSELING

## 2022-03-22 PROCEDURE — 99213 OFFICE O/P EST LOW 20 MIN: CPT

## 2022-03-22 PROCEDURE — ? SUNSCREEN TREATMENT REGIMEN

## 2022-03-22 ASSESSMENT — LOCATION DETAILED DESCRIPTION DERM
LOCATION DETAILED: LEFT PROXIMAL DORSAL FOREARM
LOCATION DETAILED: RIGHT SUPERIOR UPPER BACK
LOCATION DETAILED: LEFT RADIAL DORSAL HAND
LOCATION DETAILED: EPIGASTRIC SKIN
LOCATION DETAILED: RIGHT ULNAR DORSAL HAND
LOCATION DETAILED: MIDDLE STERNUM
LOCATION DETAILED: LEFT SUPERIOR MEDIAL UPPER BACK
LOCATION DETAILED: SUBXIPHOID
LOCATION DETAILED: LEFT PROXIMAL POSTERIOR THIGH
LOCATION DETAILED: LEFT SUPERIOR HELIX

## 2022-03-22 ASSESSMENT — LOCATION ZONE DERM
LOCATION ZONE: ARM
LOCATION ZONE: TRUNK
LOCATION ZONE: LEG
LOCATION ZONE: EAR
LOCATION ZONE: HAND

## 2022-03-22 ASSESSMENT — LOCATION SIMPLE DESCRIPTION DERM
LOCATION SIMPLE: LEFT UPPER BACK
LOCATION SIMPLE: CHEST
LOCATION SIMPLE: RIGHT HAND
LOCATION SIMPLE: RIGHT UPPER BACK
LOCATION SIMPLE: LEFT POSTERIOR THIGH
LOCATION SIMPLE: LEFT FOREARM
LOCATION SIMPLE: LEFT HAND
LOCATION SIMPLE: LEFT EAR
LOCATION SIMPLE: ABDOMEN

## 2022-06-21 ENCOUNTER — APPOINTMENT (RX ONLY)
Dept: URBAN - METROPOLITAN AREA CLINIC 6 | Facility: CLINIC | Age: 67
Setting detail: DERMATOLOGY
End: 2022-06-21

## 2022-06-21 DIAGNOSIS — D22 MELANOCYTIC NEVI: ICD-10-CM

## 2022-06-21 PROBLEM — D22.71 MELANOCYTIC NEVI OF RIGHT LOWER LIMB, INCLUDING HIP: Status: ACTIVE | Noted: 2022-06-21

## 2022-06-21 PROCEDURE — 99212 OFFICE O/P EST SF 10 MIN: CPT

## 2022-06-21 PROCEDURE — ? OBSERVATION

## 2022-06-21 PROCEDURE — ? COUNSELING

## 2022-06-21 ASSESSMENT — LOCATION ZONE DERM: LOCATION ZONE: FEET

## 2022-06-21 ASSESSMENT — LOCATION SIMPLE DESCRIPTION DERM: LOCATION SIMPLE: RIGHT PLANTAR SURFACE

## 2022-06-21 ASSESSMENT — LOCATION DETAILED DESCRIPTION DERM: LOCATION DETAILED: RIGHT LATERAL PLANTAR HEEL

## 2022-07-21 ENCOUNTER — HOSPITAL ENCOUNTER (OUTPATIENT)
Dept: RADIOLOGY | Facility: MEDICAL CENTER | Age: 67
End: 2022-07-21
Payer: COMMERCIAL

## 2022-07-21 DIAGNOSIS — I42.0 DILATED CARDIOMYOPATHY (HCC): ICD-10-CM

## 2022-07-21 PROCEDURE — A9538 TC99M PYROPHOSPHATE: HCPCS

## 2022-11-14 ENCOUNTER — APPOINTMENT (RX ONLY)
Dept: URBAN - METROPOLITAN AREA CLINIC 6 | Facility: CLINIC | Age: 67
Setting detail: DERMATOLOGY
End: 2022-11-14

## 2022-11-14 DIAGNOSIS — Z86.007 PERSONAL HISTORY OF IN-SITU NEOPLASM OF SKIN: ICD-10-CM

## 2022-11-14 DIAGNOSIS — Z87.2 PERSONAL HISTORY OF DISEASES OF THE SKIN AND SUBCUTANEOUS TISSUE: ICD-10-CM | Status: UNCHANGED

## 2022-11-14 DIAGNOSIS — D22 MELANOCYTIC NEVI: ICD-10-CM

## 2022-11-14 DIAGNOSIS — D17 BENIGN LIPOMATOUS NEOPLASM: ICD-10-CM

## 2022-11-14 DIAGNOSIS — D18.0 HEMANGIOMA: ICD-10-CM

## 2022-11-14 DIAGNOSIS — L81.4 OTHER MELANIN HYPERPIGMENTATION: ICD-10-CM

## 2022-11-14 DIAGNOSIS — Z71.89 OTHER SPECIFIED COUNSELING: ICD-10-CM

## 2022-11-14 DIAGNOSIS — L82.1 OTHER SEBORRHEIC KERATOSIS: ICD-10-CM

## 2022-11-14 PROBLEM — D18.01 HEMANGIOMA OF SKIN AND SUBCUTANEOUS TISSUE: Status: ACTIVE | Noted: 2022-11-14

## 2022-11-14 PROBLEM — D22.5 MELANOCYTIC NEVI OF TRUNK: Status: ACTIVE | Noted: 2022-11-14

## 2022-11-14 PROBLEM — D23.61 OTHER BENIGN NEOPLASM OF SKIN OF RIGHT UPPER LIMB, INCLUDING SHOULDER: Status: ACTIVE | Noted: 2022-11-14

## 2022-11-14 PROBLEM — D22.71 MELANOCYTIC NEVI OF RIGHT LOWER LIMB, INCLUDING HIP: Status: ACTIVE | Noted: 2022-11-14

## 2022-11-14 PROBLEM — D17.24 BENIGN LIPOMATOUS NEOPLASM OF SKIN AND SUBCUTANEOUS TISSUE OF LEFT LEG: Status: ACTIVE | Noted: 2022-11-14

## 2022-11-14 PROCEDURE — ? COUNSELING

## 2022-11-14 PROCEDURE — ? SUNSCREEN TREATMENT REGIMEN

## 2022-11-14 PROCEDURE — ? OBSERVATION

## 2022-11-14 PROCEDURE — 99213 OFFICE O/P EST LOW 20 MIN: CPT

## 2022-11-14 PROCEDURE — ? SUNSCREEN RECOMMENDATIONS

## 2022-11-14 ASSESSMENT — LOCATION DETAILED DESCRIPTION DERM
LOCATION DETAILED: SUBXIPHOID
LOCATION DETAILED: MIDDLE STERNUM
LOCATION DETAILED: LEFT RADIAL DORSAL HAND
LOCATION DETAILED: RIGHT SUPERIOR UPPER BACK
LOCATION DETAILED: LEFT PROXIMAL POSTERIOR THIGH
LOCATION DETAILED: RIGHT LATERAL PLANTAR HEEL
LOCATION DETAILED: RIGHT ULNAR DORSAL HAND
LOCATION DETAILED: LEFT SUPERIOR MEDIAL UPPER BACK
LOCATION DETAILED: LEFT PROXIMAL DORSAL FOREARM
LOCATION DETAILED: EPIGASTRIC SKIN

## 2022-11-14 ASSESSMENT — LOCATION SIMPLE DESCRIPTION DERM
LOCATION SIMPLE: LEFT POSTERIOR THIGH
LOCATION SIMPLE: LEFT FOREARM
LOCATION SIMPLE: LEFT HAND
LOCATION SIMPLE: LEFT UPPER BACK
LOCATION SIMPLE: RIGHT HAND
LOCATION SIMPLE: RIGHT PLANTAR SURFACE
LOCATION SIMPLE: RIGHT UPPER BACK
LOCATION SIMPLE: CHEST
LOCATION SIMPLE: ABDOMEN

## 2022-11-14 ASSESSMENT — LOCATION ZONE DERM
LOCATION ZONE: HAND
LOCATION ZONE: ARM
LOCATION ZONE: TRUNK
LOCATION ZONE: LEG
LOCATION ZONE: FEET

## 2023-06-01 ENCOUNTER — APPOINTMENT (RX ONLY)
Dept: URBAN - METROPOLITAN AREA CLINIC 6 | Facility: CLINIC | Age: 68
Setting detail: DERMATOLOGY
End: 2023-06-01

## 2023-06-01 DIAGNOSIS — D22 MELANOCYTIC NEVI: ICD-10-CM

## 2023-06-01 DIAGNOSIS — D18.0 HEMANGIOMA: ICD-10-CM

## 2023-06-01 DIAGNOSIS — D17 BENIGN LIPOMATOUS NEOPLASM: ICD-10-CM

## 2023-06-01 DIAGNOSIS — Z71.89 OTHER SPECIFIED COUNSELING: ICD-10-CM

## 2023-06-01 DIAGNOSIS — Z86.007 PERSONAL HISTORY OF IN-SITU NEOPLASM OF SKIN: ICD-10-CM | Status: STABLE

## 2023-06-01 DIAGNOSIS — L82.1 OTHER SEBORRHEIC KERATOSIS: ICD-10-CM

## 2023-06-01 DIAGNOSIS — L81.4 OTHER MELANIN HYPERPIGMENTATION: ICD-10-CM

## 2023-06-01 DIAGNOSIS — Z87.2 PERSONAL HISTORY OF DISEASES OF THE SKIN AND SUBCUTANEOUS TISSUE: ICD-10-CM | Status: STABLE

## 2023-06-01 PROBLEM — D23.61 OTHER BENIGN NEOPLASM OF SKIN OF RIGHT UPPER LIMB, INCLUDING SHOULDER: Status: ACTIVE | Noted: 2023-06-01

## 2023-06-01 PROBLEM — D18.01 HEMANGIOMA OF SKIN AND SUBCUTANEOUS TISSUE: Status: ACTIVE | Noted: 2023-06-01

## 2023-06-01 PROBLEM — D22.71 MELANOCYTIC NEVI OF RIGHT LOWER LIMB, INCLUDING HIP: Status: ACTIVE | Noted: 2023-06-01

## 2023-06-01 PROBLEM — D17.24 BENIGN LIPOMATOUS NEOPLASM OF SKIN AND SUBCUTANEOUS TISSUE OF LEFT LEG: Status: ACTIVE | Noted: 2023-06-01

## 2023-06-01 PROBLEM — D22.5 MELANOCYTIC NEVI OF TRUNK: Status: ACTIVE | Noted: 2023-06-01

## 2023-06-01 PROBLEM — D17.1 BENIGN LIPOMATOUS NEOPLASM OF SKIN AND SUBCUTANEOUS TISSUE OF TRUNK: Status: ACTIVE | Noted: 2023-06-01

## 2023-06-01 PROCEDURE — ? COUNSELING

## 2023-06-01 PROCEDURE — ? DEFER

## 2023-06-01 PROCEDURE — ? OBSERVATION

## 2023-06-01 PROCEDURE — 99213 OFFICE O/P EST LOW 20 MIN: CPT

## 2023-06-01 PROCEDURE — ? SUNSCREEN TREATMENT REGIMEN

## 2023-06-01 ASSESSMENT — LOCATION SIMPLE DESCRIPTION DERM
LOCATION SIMPLE: ABDOMEN
LOCATION SIMPLE: RIGHT PLANTAR SURFACE
LOCATION SIMPLE: UPPER BACK
LOCATION SIMPLE: LEFT POSTERIOR THIGH
LOCATION SIMPLE: LEFT HAND
LOCATION SIMPLE: LEFT UPPER BACK
LOCATION SIMPLE: RIGHT UPPER BACK
LOCATION SIMPLE: CHEST
LOCATION SIMPLE: RIGHT HAND
LOCATION SIMPLE: LEFT FOREHEAD

## 2023-06-01 ASSESSMENT — LOCATION DETAILED DESCRIPTION DERM
LOCATION DETAILED: EPIGASTRIC SKIN
LOCATION DETAILED: LEFT RADIAL DORSAL HAND
LOCATION DETAILED: RIGHT RADIAL DORSAL HAND
LOCATION DETAILED: RIGHT SUPERIOR UPPER BACK
LOCATION DETAILED: LEFT INFERIOR FOREHEAD
LOCATION DETAILED: PERIUMBILICAL SKIN
LOCATION DETAILED: LEFT PROXIMAL POSTERIOR THIGH
LOCATION DETAILED: LEFT SUPERIOR MEDIAL UPPER BACK
LOCATION DETAILED: RIGHT LATERAL PLANTAR HEEL
LOCATION DETAILED: UPPER STERNUM
LOCATION DETAILED: LEFT LATERAL ABDOMEN
LOCATION DETAILED: INFERIOR THORACIC SPINE

## 2023-06-01 ASSESSMENT — LOCATION ZONE DERM
LOCATION ZONE: LEG
LOCATION ZONE: FEET
LOCATION ZONE: TRUNK
LOCATION ZONE: HAND
LOCATION ZONE: FACE

## 2023-06-01 NOTE — PROCEDURE: DEFER
Introduction Text (Please End With A Colon): Treat at f/u visit
Size Of Lesion In Cm (Optional): 0
Detail Level: Detailed

## 2023-12-05 ENCOUNTER — APPOINTMENT (RX ONLY)
Dept: URBAN - METROPOLITAN AREA CLINIC 6 | Facility: CLINIC | Age: 68
Setting detail: DERMATOLOGY
End: 2023-12-05

## 2023-12-05 DIAGNOSIS — D22 MELANOCYTIC NEVI: ICD-10-CM

## 2023-12-05 DIAGNOSIS — L82.0 INFLAMED SEBORRHEIC KERATOSIS: ICD-10-CM

## 2023-12-05 DIAGNOSIS — D18.0 HEMANGIOMA: ICD-10-CM

## 2023-12-05 DIAGNOSIS — L81.4 OTHER MELANIN HYPERPIGMENTATION: ICD-10-CM

## 2023-12-05 DIAGNOSIS — Z71.89 OTHER SPECIFIED COUNSELING: ICD-10-CM

## 2023-12-05 DIAGNOSIS — L82.1 OTHER SEBORRHEIC KERATOSIS: ICD-10-CM

## 2023-12-05 DIAGNOSIS — Z87.2 PERSONAL HISTORY OF DISEASES OF THE SKIN AND SUBCUTANEOUS TISSUE: ICD-10-CM

## 2023-12-05 DIAGNOSIS — Z86.007 PERSONAL HISTORY OF IN-SITU NEOPLASM OF SKIN: ICD-10-CM

## 2023-12-05 DIAGNOSIS — D17 BENIGN LIPOMATOUS NEOPLASM: ICD-10-CM

## 2023-12-05 PROBLEM — D18.01 HEMANGIOMA OF SKIN AND SUBCUTANEOUS TISSUE: Status: ACTIVE | Noted: 2023-12-05

## 2023-12-05 PROBLEM — D22.5 MELANOCYTIC NEVI OF TRUNK: Status: ACTIVE | Noted: 2023-12-05

## 2023-12-05 PROBLEM — D17.24 BENIGN LIPOMATOUS NEOPLASM OF SKIN AND SUBCUTANEOUS TISSUE OF LEFT LEG: Status: ACTIVE | Noted: 2023-12-05

## 2023-12-05 PROBLEM — D23.61 OTHER BENIGN NEOPLASM OF SKIN OF RIGHT UPPER LIMB, INCLUDING SHOULDER: Status: ACTIVE | Noted: 2023-12-05

## 2023-12-05 PROBLEM — D22.71 MELANOCYTIC NEVI OF RIGHT LOWER LIMB, INCLUDING HIP: Status: ACTIVE | Noted: 2023-12-05

## 2023-12-05 PROBLEM — D17.1 BENIGN LIPOMATOUS NEOPLASM OF SKIN AND SUBCUTANEOUS TISSUE OF TRUNK: Status: ACTIVE | Noted: 2023-12-05

## 2023-12-05 PROCEDURE — 99213 OFFICE O/P EST LOW 20 MIN: CPT | Mod: 25

## 2023-12-05 PROCEDURE — ? DEFER

## 2023-12-05 PROCEDURE — ? OBSERVATION

## 2023-12-05 PROCEDURE — ? SUNSCREEN TREATMENT REGIMEN

## 2023-12-05 PROCEDURE — 17110 DESTRUCTION B9 LES UP TO 14: CPT

## 2023-12-05 PROCEDURE — ? LIQUID NITROGEN

## 2023-12-05 PROCEDURE — ? COUNSELING

## 2023-12-05 ASSESSMENT — LOCATION SIMPLE DESCRIPTION DERM
LOCATION SIMPLE: LEFT HAND
LOCATION SIMPLE: RIGHT PLANTAR SURFACE
LOCATION SIMPLE: LEFT ANTERIOR AXILLA
LOCATION SIMPLE: RIGHT UPPER BACK
LOCATION SIMPLE: UPPER BACK
LOCATION SIMPLE: RIGHT HAND
LOCATION SIMPLE: CHEST
LOCATION SIMPLE: LEFT POSTERIOR THIGH
LOCATION SIMPLE: LEFT BREAST
LOCATION SIMPLE: LEFT UPPER BACK
LOCATION SIMPLE: ABDOMEN
LOCATION SIMPLE: LEFT FOREHEAD

## 2023-12-05 ASSESSMENT — LOCATION DETAILED DESCRIPTION DERM
LOCATION DETAILED: PERIUMBILICAL SKIN
LOCATION DETAILED: LEFT AXILLARY TAIL OF BREAST
LOCATION DETAILED: LEFT INFERIOR FOREHEAD
LOCATION DETAILED: RIGHT LATERAL PLANTAR HEEL
LOCATION DETAILED: UPPER STERNUM
LOCATION DETAILED: EPIGASTRIC SKIN
LOCATION DETAILED: LEFT LATERAL ABDOMEN
LOCATION DETAILED: LEFT PROXIMAL POSTERIOR THIGH
LOCATION DETAILED: LEFT RADIAL DORSAL HAND
LOCATION DETAILED: RIGHT SUPERIOR UPPER BACK
LOCATION DETAILED: LEFT ANTERIOR AXILLA
LOCATION DETAILED: RIGHT RADIAL DORSAL HAND
LOCATION DETAILED: INFERIOR THORACIC SPINE
LOCATION DETAILED: LEFT SUPERIOR MEDIAL UPPER BACK

## 2023-12-05 ASSESSMENT — LOCATION ZONE DERM
LOCATION ZONE: TRUNK
LOCATION ZONE: HAND
LOCATION ZONE: LEG
LOCATION ZONE: AXILLAE
LOCATION ZONE: FEET
LOCATION ZONE: FACE

## 2023-12-05 NOTE — PROCEDURE: LIQUID NITROGEN
Consent: The patient's consent was obtained including but not limited to risks of crusting, scabbing, blistering, scarring, darker or lighter pigmentary change, recurrence, incomplete removal and infection.
Add 52 Modifier (Optional): no
Show Aperture Variable?: Yes
Number Of Freeze-Thaw Cycles: 3 freeze-thaw cycles
Medical Necessity Clause: This procedure was medically necessary because the lesions that were treated were:
Medical Necessity Information: It is in your best interest to select a reason for this procedure from the list below. All of these items fulfill various CMS LCD requirements except the new and changing color options.
Spray Paint Text: The liquid nitrogen was applied to the skin utilizing a spray paint frosting technique.
Post-Care Instructions: I reviewed with the patient in detail post-care instructions. Patient is to wear sunprotection, and avoid picking at any of the treated lesions. Pt may apply Vaseline to crusted or scabbing areas.
Duration Of Freeze Thaw-Cycle (Seconds): 10-15
Detail Level: Detailed

## 2024-02-03 ENCOUNTER — HOSPITAL ENCOUNTER (OUTPATIENT)
Dept: LAB | Facility: MEDICAL CENTER | Age: 69
End: 2024-02-03
Attending: NURSE PRACTITIONER
Payer: COMMERCIAL

## 2024-02-03 LAB
ALBUMIN SERPL BCP-MCNC: 4.3 G/DL (ref 3.2–4.9)
ALBUMIN SERPL BCP-MCNC: 4.3 G/DL (ref 3.2–4.9)
ALBUMIN/GLOB SERPL: 1.5 G/DL
ALBUMIN/GLOB SERPL: 1.5 G/DL
ALP SERPL-CCNC: 84 U/L (ref 30–99)
ALP SERPL-CCNC: 85 U/L (ref 30–99)
ALT SERPL-CCNC: 10 U/L (ref 2–50)
ALT SERPL-CCNC: 12 U/L (ref 2–50)
ANION GAP SERPL CALC-SCNC: 14 MMOL/L (ref 7–16)
ANION GAP SERPL CALC-SCNC: 22 MMOL/L (ref 7–16)
APPEARANCE UR: CLEAR
AST SERPL-CCNC: 16 U/L (ref 12–45)
AST SERPL-CCNC: 17 U/L (ref 12–45)
BACTERIA #/AREA URNS HPF: NEGATIVE /HPF
BASOPHILS # BLD AUTO: 0.6 % (ref 0–1.8)
BASOPHILS # BLD: 0.05 K/UL (ref 0–0.12)
BILIRUB SERPL-MCNC: 0.5 MG/DL (ref 0.1–1.5)
BILIRUB SERPL-MCNC: 0.5 MG/DL (ref 0.1–1.5)
BILIRUB UR QL STRIP.AUTO: NEGATIVE
BUN SERPL-MCNC: 24 MG/DL (ref 8–22)
BUN SERPL-MCNC: 25 MG/DL (ref 8–22)
CALCIUM ALBUM COR SERPL-MCNC: 9.2 MG/DL (ref 8.5–10.5)
CALCIUM ALBUM COR SERPL-MCNC: 9.3 MG/DL (ref 8.5–10.5)
CALCIUM SERPL-MCNC: 9.4 MG/DL (ref 8.5–10.5)
CALCIUM SERPL-MCNC: 9.5 MG/DL (ref 8.5–10.5)
CHLORIDE SERPL-SCNC: 101 MMOL/L (ref 96–112)
CHLORIDE SERPL-SCNC: 102 MMOL/L (ref 96–112)
CHOLEST SERPL-MCNC: 184 MG/DL (ref 100–199)
CHOLEST SERPL-MCNC: 184 MG/DL (ref 100–199)
CO2 SERPL-SCNC: 14 MMOL/L (ref 20–33)
CO2 SERPL-SCNC: 24 MMOL/L (ref 20–33)
COLOR UR: YELLOW
CREAT SERPL-MCNC: 0.82 MG/DL (ref 0.5–1.4)
CREAT SERPL-MCNC: 0.82 MG/DL (ref 0.5–1.4)
EOSINOPHIL # BLD AUTO: 0.15 K/UL (ref 0–0.51)
EOSINOPHIL NFR BLD: 1.9 % (ref 0–6.9)
EPI CELLS #/AREA URNS HPF: ABNORMAL /HPF
ERYTHROCYTE [DISTWIDTH] IN BLOOD BY AUTOMATED COUNT: 43.3 FL (ref 35.9–50)
EST. AVERAGE GLUCOSE BLD GHB EST-MCNC: 146 MG/DL
EST. AVERAGE GLUCOSE BLD GHB EST-MCNC: 146 MG/DL
FASTING STATUS PATIENT QL REPORTED: NORMAL
FASTING STATUS PATIENT QL REPORTED: NORMAL
GFR SERPLBLD CREATININE-BSD FMLA CKD-EPI: 78 ML/MIN/1.73 M 2
GFR SERPLBLD CREATININE-BSD FMLA CKD-EPI: 78 ML/MIN/1.73 M 2
GLOBULIN SER CALC-MCNC: 2.8 G/DL (ref 1.9–3.5)
GLOBULIN SER CALC-MCNC: 2.8 G/DL (ref 1.9–3.5)
GLUCOSE SERPL-MCNC: 148 MG/DL (ref 65–99)
GLUCOSE SERPL-MCNC: 149 MG/DL (ref 65–99)
GLUCOSE UR STRIP.AUTO-MCNC: NEGATIVE MG/DL
HBA1C MFR BLD: 6.7 % (ref 4–5.6)
HBA1C MFR BLD: 6.7 % (ref 4–5.6)
HCT VFR BLD AUTO: 45.4 % (ref 37–47)
HDLC SERPL-MCNC: 41 MG/DL
HDLC SERPL-MCNC: 41 MG/DL
HGB BLD-MCNC: 14.9 G/DL (ref 12–16)
HYALINE CASTS #/AREA URNS LPF: ABNORMAL /LPF
IMM GRANULOCYTES # BLD AUTO: 0.03 K/UL (ref 0–0.11)
IMM GRANULOCYTES NFR BLD AUTO: 0.4 % (ref 0–0.9)
KETONES UR STRIP.AUTO-MCNC: NEGATIVE MG/DL
LDLC SERPL CALC-MCNC: 105 MG/DL
LDLC SERPL CALC-MCNC: 106 MG/DL
LEUKOCYTE ESTERASE UR QL STRIP.AUTO: ABNORMAL
LYMPHOCYTES # BLD AUTO: 2.02 K/UL (ref 1–4.8)
LYMPHOCYTES NFR BLD: 25.4 % (ref 22–41)
MCH RBC QN AUTO: 30.3 PG (ref 27–33)
MCHC RBC AUTO-ENTMCNC: 32.8 G/DL (ref 32.2–35.5)
MCV RBC AUTO: 92.3 FL (ref 81.4–97.8)
MICRO URNS: ABNORMAL
MONOCYTES # BLD AUTO: 0.62 K/UL (ref 0–0.85)
MONOCYTES NFR BLD AUTO: 7.8 % (ref 0–13.4)
NEUTROPHILS # BLD AUTO: 5.09 K/UL (ref 1.82–7.42)
NEUTROPHILS NFR BLD: 63.9 % (ref 44–72)
NITRITE UR QL STRIP.AUTO: NEGATIVE
NRBC # BLD AUTO: 0 K/UL
NRBC BLD-RTO: 0 /100 WBC (ref 0–0.2)
PH UR STRIP.AUTO: 6 [PH] (ref 5–8)
PLATELET # BLD AUTO: 366 K/UL (ref 164–446)
PMV BLD AUTO: 10 FL (ref 9–12.9)
POTASSIUM SERPL-SCNC: 4.7 MMOL/L (ref 3.6–5.5)
POTASSIUM SERPL-SCNC: 4.7 MMOL/L (ref 3.6–5.5)
PROT SERPL-MCNC: 7.1 G/DL (ref 6–8.2)
PROT SERPL-MCNC: 7.1 G/DL (ref 6–8.2)
PROT UR QL STRIP: NEGATIVE MG/DL
RBC # BLD AUTO: 4.92 M/UL (ref 4.2–5.4)
RBC # URNS HPF: ABNORMAL /HPF
RBC UR QL AUTO: NEGATIVE
SODIUM SERPL-SCNC: 137 MMOL/L (ref 135–145)
SODIUM SERPL-SCNC: 140 MMOL/L (ref 135–145)
SP GR UR STRIP.AUTO: 1.01
TRIGL SERPL-MCNC: 187 MG/DL (ref 0–149)
TRIGL SERPL-MCNC: 188 MG/DL (ref 0–149)
TSH SERPL DL<=0.005 MIU/L-ACNC: 1.94 UIU/ML (ref 0.38–5.33)
UROBILINOGEN UR STRIP.AUTO-MCNC: 0.2 MG/DL
WBC # BLD AUTO: 8 K/UL (ref 4.8–10.8)
WBC #/AREA URNS HPF: ABNORMAL /HPF

## 2024-02-03 PROCEDURE — 81001 URINALYSIS AUTO W/SCOPE: CPT

## 2024-02-03 PROCEDURE — 85025 COMPLETE CBC W/AUTO DIFF WBC: CPT

## 2024-02-03 PROCEDURE — 84443 ASSAY THYROID STIM HORMONE: CPT

## 2024-02-03 PROCEDURE — 83036 HEMOGLOBIN GLYCOSYLATED A1C: CPT | Mod: 91

## 2024-02-03 PROCEDURE — 83036 HEMOGLOBIN GLYCOSYLATED A1C: CPT

## 2024-02-03 PROCEDURE — 80061 LIPID PANEL: CPT

## 2024-02-03 PROCEDURE — 80061 LIPID PANEL: CPT | Mod: 91

## 2024-02-03 PROCEDURE — 87086 URINE CULTURE/COLONY COUNT: CPT

## 2024-02-03 PROCEDURE — 80053 COMPREHEN METABOLIC PANEL: CPT

## 2024-02-03 PROCEDURE — 80053 COMPREHEN METABOLIC PANEL: CPT | Mod: 91

## 2024-02-03 PROCEDURE — 36415 COLL VENOUS BLD VENIPUNCTURE: CPT

## 2024-02-05 LAB
BACTERIA UR CULT: NORMAL
SIGNIFICANT IND 70042: NORMAL
SITE SITE: NORMAL
SOURCE SOURCE: NORMAL

## 2024-05-25 ENCOUNTER — HOSPITAL ENCOUNTER (OUTPATIENT)
Dept: LAB | Facility: MEDICAL CENTER | Age: 69
End: 2024-05-25
Payer: COMMERCIAL

## 2024-05-25 LAB
ALBUMIN SERPL BCP-MCNC: 4.2 G/DL (ref 3.2–4.9)
ALBUMIN/GLOB SERPL: 1.5 G/DL
ALP SERPL-CCNC: 83 U/L (ref 30–99)
ALT SERPL-CCNC: 15 U/L (ref 2–50)
ANION GAP SERPL CALC-SCNC: 11 MMOL/L (ref 7–16)
APPEARANCE UR: CLEAR
AST SERPL-CCNC: 14 U/L (ref 12–45)
BACTERIA #/AREA URNS HPF: NEGATIVE /HPF
BILIRUB SERPL-MCNC: 0.5 MG/DL (ref 0.1–1.5)
BILIRUB UR QL STRIP.AUTO: NEGATIVE
BUN SERPL-MCNC: 24 MG/DL (ref 8–22)
CALCIUM ALBUM COR SERPL-MCNC: 9.5 MG/DL (ref 8.5–10.5)
CALCIUM SERPL-MCNC: 9.7 MG/DL (ref 8.5–10.5)
CHLORIDE SERPL-SCNC: 103 MMOL/L (ref 96–112)
CHOLEST SERPL-MCNC: 176 MG/DL (ref 100–199)
CO2 SERPL-SCNC: 25 MMOL/L (ref 20–33)
COLOR UR: YELLOW
CREAT SERPL-MCNC: 0.79 MG/DL (ref 0.5–1.4)
EPI CELLS #/AREA URNS HPF: NEGATIVE /HPF
EST. AVERAGE GLUCOSE BLD GHB EST-MCNC: 146 MG/DL
GFR SERPLBLD CREATININE-BSD FMLA CKD-EPI: 81 ML/MIN/1.73 M 2
GLOBULIN SER CALC-MCNC: 2.8 G/DL (ref 1.9–3.5)
GLUCOSE SERPL-MCNC: 154 MG/DL (ref 65–99)
GLUCOSE UR STRIP.AUTO-MCNC: NEGATIVE MG/DL
HBA1C MFR BLD: 6.7 % (ref 4–5.6)
HDLC SERPL-MCNC: 43 MG/DL
HYALINE CASTS #/AREA URNS LPF: ABNORMAL /LPF
KETONES UR STRIP.AUTO-MCNC: NEGATIVE MG/DL
LDLC SERPL CALC-MCNC: 90 MG/DL
LEUKOCYTE ESTERASE UR QL STRIP.AUTO: ABNORMAL
MICRO URNS: ABNORMAL
NITRITE UR QL STRIP.AUTO: NEGATIVE
PH UR STRIP.AUTO: 6 [PH] (ref 5–8)
POTASSIUM SERPL-SCNC: 4.1 MMOL/L (ref 3.6–5.5)
PROT SERPL-MCNC: 7 G/DL (ref 6–8.2)
PROT UR QL STRIP: NEGATIVE MG/DL
RBC # URNS HPF: ABNORMAL /HPF
RBC UR QL AUTO: NEGATIVE
SODIUM SERPL-SCNC: 139 MMOL/L (ref 135–145)
SP GR UR STRIP.AUTO: 1.01
TRIGL SERPL-MCNC: 213 MG/DL (ref 0–149)
UROBILINOGEN UR STRIP.AUTO-MCNC: 0.2 MG/DL
WBC #/AREA URNS HPF: ABNORMAL /HPF

## 2024-05-27 ENCOUNTER — APPOINTMENT (OUTPATIENT)
Dept: RADIOLOGY | Facility: MEDICAL CENTER | Age: 69
End: 2024-05-27
Attending: EMERGENCY MEDICINE
Payer: COMMERCIAL

## 2024-05-27 ENCOUNTER — HOSPITAL ENCOUNTER (INPATIENT)
Facility: MEDICAL CENTER | Age: 69
LOS: 2 days | End: 2024-05-29
Attending: EMERGENCY MEDICINE | Admitting: STUDENT IN AN ORGANIZED HEALTH CARE EDUCATION/TRAINING PROGRAM
Payer: COMMERCIAL

## 2024-05-27 DIAGNOSIS — R10.10 UPPER ABDOMINAL PAIN: ICD-10-CM

## 2024-05-27 DIAGNOSIS — K45.0 OBSTRUCTED INTERNAL HERNIA: ICD-10-CM

## 2024-05-27 PROBLEM — R10.9 ABDOMINAL PAIN: Status: ACTIVE | Noted: 2024-05-27

## 2024-05-27 PROBLEM — I50.9 CONGESTIVE HEART FAILURE (CHF) (HCC): Status: ACTIVE | Noted: 2024-05-27

## 2024-05-27 LAB
ALBUMIN SERPL BCP-MCNC: 4.1 G/DL (ref 3.2–4.9)
ALBUMIN/GLOB SERPL: 1.6 G/DL
ALP SERPL-CCNC: 86 U/L (ref 30–99)
ALT SERPL-CCNC: 10 U/L (ref 2–50)
ANION GAP SERPL CALC-SCNC: 13 MMOL/L (ref 7–16)
APPEARANCE UR: CLEAR
AST SERPL-CCNC: 13 U/L (ref 12–45)
BACTERIA #/AREA URNS HPF: NEGATIVE /HPF
BASOPHILS # BLD AUTO: 0.3 % (ref 0–1.8)
BASOPHILS # BLD: 0.04 K/UL (ref 0–0.12)
BILIRUB SERPL-MCNC: 0.5 MG/DL (ref 0.1–1.5)
BILIRUB UR QL STRIP.AUTO: NEGATIVE
BUN SERPL-MCNC: 24 MG/DL (ref 8–22)
CALCIUM ALBUM COR SERPL-MCNC: 9 MG/DL (ref 8.5–10.5)
CALCIUM SERPL-MCNC: 9.1 MG/DL (ref 8.5–10.5)
CHLORIDE SERPL-SCNC: 105 MMOL/L (ref 96–112)
CO2 SERPL-SCNC: 20 MMOL/L (ref 20–33)
COLOR UR: YELLOW
CREAT SERPL-MCNC: 0.98 MG/DL (ref 0.5–1.4)
EKG IMPRESSION: NORMAL
EOSINOPHIL # BLD AUTO: 0.16 K/UL (ref 0–0.51)
EOSINOPHIL NFR BLD: 1.4 % (ref 0–6.9)
EPI CELLS #/AREA URNS HPF: ABNORMAL /HPF
ERYTHROCYTE [DISTWIDTH] IN BLOOD BY AUTOMATED COUNT: 46.1 FL (ref 35.9–50)
GFR SERPLBLD CREATININE-BSD FMLA CKD-EPI: 63 ML/MIN/1.73 M 2
GLOBULIN SER CALC-MCNC: 2.5 G/DL (ref 1.9–3.5)
GLUCOSE SERPL-MCNC: 172 MG/DL (ref 65–99)
GLUCOSE UR STRIP.AUTO-MCNC: NEGATIVE MG/DL
HCT VFR BLD AUTO: 40 % (ref 37–47)
HGB BLD-MCNC: 13.8 G/DL (ref 12–16)
HYALINE CASTS #/AREA URNS LPF: ABNORMAL /LPF
IMM GRANULOCYTES # BLD AUTO: 0.04 K/UL (ref 0–0.11)
IMM GRANULOCYTES NFR BLD AUTO: 0.3 % (ref 0–0.9)
KETONES UR STRIP.AUTO-MCNC: NEGATIVE MG/DL
LACTATE SERPL-SCNC: 1.2 MMOL/L (ref 0.5–2)
LEUKOCYTE ESTERASE UR QL STRIP.AUTO: ABNORMAL
LIPASE SERPL-CCNC: 31 U/L (ref 11–82)
LYMPHOCYTES # BLD AUTO: 1.88 K/UL (ref 1–4.8)
LYMPHOCYTES NFR BLD: 16.1 % (ref 22–41)
MCH RBC QN AUTO: 31.7 PG (ref 27–33)
MCHC RBC AUTO-ENTMCNC: 34.5 G/DL (ref 32.2–35.5)
MCV RBC AUTO: 91.7 FL (ref 81.4–97.8)
MICRO URNS: ABNORMAL
MONOCYTES # BLD AUTO: 0.79 K/UL (ref 0–0.85)
MONOCYTES NFR BLD AUTO: 6.8 % (ref 0–13.4)
NEUTROPHILS # BLD AUTO: 8.78 K/UL (ref 1.82–7.42)
NEUTROPHILS NFR BLD: 75.1 % (ref 44–72)
NITRITE UR QL STRIP.AUTO: NEGATIVE
NRBC # BLD AUTO: 0 K/UL
NRBC BLD-RTO: 0 /100 WBC (ref 0–0.2)
PH UR STRIP.AUTO: 5.5 [PH] (ref 5–8)
PLATELET # BLD AUTO: 349 K/UL (ref 164–446)
PMV BLD AUTO: 9.5 FL (ref 9–12.9)
POTASSIUM SERPL-SCNC: 4.2 MMOL/L (ref 3.6–5.5)
PROT SERPL-MCNC: 6.6 G/DL (ref 6–8.2)
PROT UR QL STRIP: NEGATIVE MG/DL
RBC # BLD AUTO: 4.36 M/UL (ref 4.2–5.4)
RBC # URNS HPF: ABNORMAL /HPF
RBC UR QL AUTO: NEGATIVE
SODIUM SERPL-SCNC: 138 MMOL/L (ref 135–145)
SP GR UR STRIP.AUTO: 1.02
TROPONIN T SERPL-MCNC: <6 NG/L (ref 6–19)
UROBILINOGEN UR STRIP.AUTO-MCNC: 0.2 MG/DL
WBC # BLD AUTO: 11.7 K/UL (ref 4.8–10.8)
WBC #/AREA URNS HPF: ABNORMAL /HPF

## 2024-05-27 PROCEDURE — 304538 HCHG NG TUBE

## 2024-05-27 PROCEDURE — 96374 THER/PROPH/DIAG INJ IV PUSH: CPT

## 2024-05-27 PROCEDURE — 83605 ASSAY OF LACTIC ACID: CPT

## 2024-05-27 PROCEDURE — 36415 COLL VENOUS BLD VENIPUNCTURE: CPT

## 2024-05-27 PROCEDURE — 700117 HCHG RX CONTRAST REV CODE 255: Performed by: EMERGENCY MEDICINE

## 2024-05-27 PROCEDURE — 99221 1ST HOSP IP/OBS SF/LOW 40: CPT | Performed by: STUDENT IN AN ORGANIZED HEALTH CARE EDUCATION/TRAINING PROGRAM

## 2024-05-27 PROCEDURE — 99223 1ST HOSP IP/OBS HIGH 75: CPT | Mod: GC | Performed by: STUDENT IN AN ORGANIZED HEALTH CARE EDUCATION/TRAINING PROGRAM

## 2024-05-27 PROCEDURE — 700111 HCHG RX REV CODE 636 W/ 250 OVERRIDE (IP): Performed by: EMERGENCY MEDICINE

## 2024-05-27 PROCEDURE — 93005 ELECTROCARDIOGRAM TRACING: CPT

## 2024-05-27 PROCEDURE — 700111 HCHG RX REV CODE 636 W/ 250 OVERRIDE (IP): Mod: JZ

## 2024-05-27 PROCEDURE — 96376 TX/PRO/DX INJ SAME DRUG ADON: CPT

## 2024-05-27 PROCEDURE — 770001 HCHG ROOM/CARE - MED/SURG/GYN PRIV*

## 2024-05-27 PROCEDURE — 84484 ASSAY OF TROPONIN QUANT: CPT

## 2024-05-27 PROCEDURE — 80053 COMPREHEN METABOLIC PANEL: CPT

## 2024-05-27 PROCEDURE — 99285 EMERGENCY DEPT VISIT HI MDM: CPT

## 2024-05-27 PROCEDURE — 85025 COMPLETE CBC W/AUTO DIFF WBC: CPT

## 2024-05-27 PROCEDURE — 96375 TX/PRO/DX INJ NEW DRUG ADDON: CPT

## 2024-05-27 PROCEDURE — 93005 ELECTROCARDIOGRAM TRACING: CPT | Performed by: EMERGENCY MEDICINE

## 2024-05-27 PROCEDURE — 74177 CT ABD & PELVIS W/CONTRAST: CPT

## 2024-05-27 PROCEDURE — 83690 ASSAY OF LIPASE: CPT

## 2024-05-27 PROCEDURE — 71045 X-RAY EXAM CHEST 1 VIEW: CPT

## 2024-05-27 PROCEDURE — 81001 URINALYSIS AUTO W/SCOPE: CPT

## 2024-05-27 RX ORDER — PRAVASTATIN SODIUM 80 MG/1
80 TABLET ORAL EVERY EVENING
COMMUNITY

## 2024-05-27 RX ORDER — FLUTICASONE FUROATE, UMECLIDINIUM BROMIDE AND VILANTEROL TRIFENATATE 200; 62.5; 25 UG/1; UG/1; UG/1
1 POWDER RESPIRATORY (INHALATION) EVERY MORNING
COMMUNITY
Start: 2023-12-05

## 2024-05-27 RX ORDER — SACUBITRIL AND VALSARTAN 97; 103 MG/1; MG/1
1 TABLET, FILM COATED ORAL 2 TIMES DAILY
COMMUNITY

## 2024-05-27 RX ORDER — PRAVASTATIN SODIUM 20 MG
80 TABLET ORAL EVERY EVENING
Status: DISCONTINUED | OUTPATIENT
Start: 2024-05-28 | End: 2024-05-29 | Stop reason: HOSPADM

## 2024-05-27 RX ORDER — ASPIRIN 81 MG/1
81 TABLET, CHEWABLE ORAL EVERY MORNING
COMMUNITY

## 2024-05-27 RX ORDER — MORPHINE SULFATE 4 MG/ML
4 INJECTION INTRAVENOUS ONCE
Status: COMPLETED | OUTPATIENT
Start: 2024-05-27 | End: 2024-05-27

## 2024-05-27 RX ORDER — ALBUTEROL SULFATE 90 UG/1
1-2 AEROSOL, METERED RESPIRATORY (INHALATION) 2 TIMES DAILY PRN
COMMUNITY
Start: 2024-05-21

## 2024-05-27 RX ORDER — HEPARIN SODIUM 5000 [USP'U]/ML
5000 INJECTION, SOLUTION INTRAVENOUS; SUBCUTANEOUS EVERY 8 HOURS
Status: DISCONTINUED | OUTPATIENT
Start: 2024-05-27 | End: 2024-05-29 | Stop reason: HOSPADM

## 2024-05-27 RX ORDER — ONDANSETRON 2 MG/ML
4 INJECTION INTRAMUSCULAR; INTRAVENOUS EVERY 8 HOURS PRN
Status: DISCONTINUED | OUTPATIENT
Start: 2024-05-27 | End: 2024-05-29 | Stop reason: HOSPADM

## 2024-05-27 RX ORDER — SPIRONOLACTONE 25 MG/1
25 TABLET ORAL EVERY MORNING
COMMUNITY
Start: 2024-03-12

## 2024-05-27 RX ORDER — FLUTICASONE FUROATE AND VILANTEROL 100; 25 UG/1; UG/1
1 POWDER RESPIRATORY (INHALATION) EVERY MORNING
Status: DISCONTINUED | OUTPATIENT
Start: 2024-05-28 | End: 2024-05-27

## 2024-05-27 RX ORDER — ONDANSETRON 2 MG/ML
4 INJECTION INTRAMUSCULAR; INTRAVENOUS ONCE
Status: COMPLETED | OUTPATIENT
Start: 2024-05-27 | End: 2024-05-27

## 2024-05-27 RX ORDER — MONTELUKAST SODIUM 10 MG/1
10 TABLET ORAL EVERY MORNING
COMMUNITY

## 2024-05-27 RX ORDER — FUROSEMIDE 20 MG/1
20 TABLET ORAL EVERY MORNING
COMMUNITY

## 2024-05-27 RX ORDER — CARVEDILOL 25 MG/1
25 TABLET ORAL 2 TIMES DAILY
COMMUNITY
Start: 2023-12-29

## 2024-05-27 RX ORDER — LATANOPROST 50 UG/ML
1 SOLUTION/ DROPS OPHTHALMIC EVERY EVENING
Status: DISCONTINUED | OUTPATIENT
Start: 2024-05-28 | End: 2024-05-29 | Stop reason: HOSPADM

## 2024-05-27 RX ORDER — CARVEDILOL 25 MG/1
25 TABLET ORAL 2 TIMES DAILY
Status: DISCONTINUED | OUTPATIENT
Start: 2024-05-28 | End: 2024-05-29 | Stop reason: HOSPADM

## 2024-05-27 RX ORDER — MONTELUKAST SODIUM 10 MG/1
10 TABLET ORAL EVERY MORNING
Status: DISCONTINUED | OUTPATIENT
Start: 2024-05-28 | End: 2024-05-29 | Stop reason: HOSPADM

## 2024-05-27 RX ORDER — PANTOPRAZOLE SODIUM 40 MG/10ML
40 INJECTION, POWDER, LYOPHILIZED, FOR SOLUTION INTRAVENOUS DAILY
Status: DISCONTINUED | OUTPATIENT
Start: 2024-05-28 | End: 2024-05-28

## 2024-05-27 RX ORDER — TIMOLOL MALEATE 5 MG/ML
1 SOLUTION/ DROPS OPHTHALMIC EVERY MORNING
Status: DISCONTINUED | OUTPATIENT
Start: 2024-05-28 | End: 2024-05-29 | Stop reason: HOSPADM

## 2024-05-27 RX ORDER — SPIRONOLACTONE 25 MG/1
25 TABLET ORAL EVERY MORNING
Status: DISCONTINUED | OUTPATIENT
Start: 2024-05-28 | End: 2024-05-29 | Stop reason: HOSPADM

## 2024-05-27 RX ORDER — OMEPRAZOLE 40 MG/1
40 CAPSULE, DELAYED RELEASE ORAL EVERY MORNING
COMMUNITY

## 2024-05-27 RX ORDER — MAGNESIUM 64 MG (MAGNESIUM CHLORIDE) TABLET,DELAYED RELEASE
64 2 TIMES DAILY
COMMUNITY
Start: 2023-11-29 | End: 2024-11-28

## 2024-05-27 RX ORDER — FLUTICASONE FUROATE AND VILANTEROL 100; 25 UG/1; UG/1
1 POWDER RESPIRATORY (INHALATION) EVERY MORNING
COMMUNITY

## 2024-05-27 RX ADMIN — IOHEXOL 100 ML: 350 INJECTION, SOLUTION INTRAVENOUS at 18:38

## 2024-05-27 RX ADMIN — MORPHINE SULFATE 4 MG: 4 INJECTION INTRAVENOUS at 19:29

## 2024-05-27 RX ADMIN — ONDANSETRON 4 MG: 2 INJECTION INTRAMUSCULAR; INTRAVENOUS at 23:58

## 2024-05-27 RX ADMIN — MORPHINE SULFATE 4 MG: 4 INJECTION INTRAVENOUS at 18:26

## 2024-05-27 RX ADMIN — ONDANSETRON 4 MG: 2 INJECTION INTRAMUSCULAR; INTRAVENOUS at 18:26

## 2024-05-27 RX ADMIN — HEPARIN SODIUM 5000 UNITS: 5000 INJECTION, SOLUTION INTRAVENOUS; SUBCUTANEOUS at 21:18

## 2024-05-27 SDOH — ECONOMIC STABILITY: TRANSPORTATION INSECURITY
IN THE PAST 12 MONTHS, HAS LACK OF RELIABLE TRANSPORTATION KEPT YOU FROM MEDICAL APPOINTMENTS, MEETINGS, WORK OR FROM GETTING THINGS NEEDED FOR DAILY LIVING?: NO

## 2024-05-27 SDOH — ECONOMIC STABILITY: TRANSPORTATION INSECURITY
IN THE PAST 12 MONTHS, HAS THE LACK OF TRANSPORTATION KEPT YOU FROM MEDICAL APPOINTMENTS OR FROM GETTING MEDICATIONS?: NO

## 2024-05-27 ASSESSMENT — COGNITIVE AND FUNCTIONAL STATUS - GENERAL
SUGGESTED CMS G CODE MODIFIER MOBILITY: CH
MOBILITY SCORE: 24
DAILY ACTIVITIY SCORE: 24
SUGGESTED CMS G CODE MODIFIER DAILY ACTIVITY: CH

## 2024-05-27 ASSESSMENT — LIFESTYLE VARIABLES
TOTAL SCORE: 0
AVERAGE NUMBER OF DAYS PER WEEK YOU HAVE A DRINK CONTAINING ALCOHOL: 0
TOTAL SCORE: 0
ON A TYPICAL DAY WHEN YOU DRINK ALCOHOL HOW MANY DRINKS DO YOU HAVE: 0
HOW MANY TIMES IN THE PAST YEAR HAVE YOU HAD 5 OR MORE DRINKS IN A DAY: 0
ALCOHOL_USE: NO
DOES PATIENT WANT TO STOP DRINKING: NO
HAVE PEOPLE ANNOYED YOU BY CRITICIZING YOUR DRINKING: NO
CONSUMPTION TOTAL: NEGATIVE
HAVE YOU EVER FELT YOU SHOULD CUT DOWN ON YOUR DRINKING: NO
TOTAL SCORE: 0
EVER FELT BAD OR GUILTY ABOUT YOUR DRINKING: NO
EVER HAD A DRINK FIRST THING IN THE MORNING TO STEADY YOUR NERVES TO GET RID OF A HANGOVER: NO

## 2024-05-27 ASSESSMENT — SOCIAL DETERMINANTS OF HEALTH (SDOH)
WITHIN THE PAST 12 MONTHS, THE FOOD YOU BOUGHT JUST DIDN'T LAST AND YOU DIDN'T HAVE MONEY TO GET MORE: NEVER TRUE
WITHIN THE LAST YEAR, HAVE YOU BEEN HUMILIATED OR EMOTIONALLY ABUSED IN OTHER WAYS BY YOUR PARTNER OR EX-PARTNER?: NO
WITHIN THE LAST YEAR, HAVE TO BEEN RAPED OR FORCED TO HAVE ANY KIND OF SEXUAL ACTIVITY BY YOUR PARTNER OR EX-PARTNER?: NO
WITHIN THE LAST YEAR, HAVE YOU BEEN AFRAID OF YOUR PARTNER OR EX-PARTNER?: NO
WITHIN THE LAST YEAR, HAVE YOU BEEN KICKED, HIT, SLAPPED, OR OTHERWISE PHYSICALLY HURT BY YOUR PARTNER OR EX-PARTNER?: NO
WITHIN THE PAST 12 MONTHS, YOU WORRIED THAT YOUR FOOD WOULD RUN OUT BEFORE YOU GOT THE MONEY TO BUY MORE: NEVER TRUE
IN THE PAST 12 MONTHS, HAS THE ELECTRIC, GAS, OIL, OR WATER COMPANY THREATENED TO SHUT OFF SERVICE IN YOUR HOME?: NO

## 2024-05-27 ASSESSMENT — ENCOUNTER SYMPTOMS
CARDIOVASCULAR NEGATIVE: 1
NEUROLOGICAL NEGATIVE: 1
EYES NEGATIVE: 1
RESPIRATORY NEGATIVE: 1
MUSCULOSKELETAL NEGATIVE: 1
CONSTITUTIONAL NEGATIVE: 1
PSYCHIATRIC NEGATIVE: 1

## 2024-05-27 ASSESSMENT — PATIENT HEALTH QUESTIONNAIRE - PHQ9
SUM OF ALL RESPONSES TO PHQ9 QUESTIONS 1 AND 2: 0
2. FEELING DOWN, DEPRESSED, IRRITABLE, OR HOPELESS: NOT AT ALL
1. LITTLE INTEREST OR PLEASURE IN DOING THINGS: NOT AT ALL

## 2024-05-27 ASSESSMENT — FIBROSIS 4 INDEX
FIB4 SCORE: 0.62
FIB4 SCORE: 0.8

## 2024-05-27 ASSESSMENT — PAIN DESCRIPTION - PAIN TYPE
TYPE: ACUTE PAIN
TYPE: ACUTE PAIN

## 2024-05-27 NOTE — ED TRIAGE NOTES
"Chief Complaint   Patient presents with    Abdominal Pain     Pt states she had a reversal of a colostomy done in March.  Pt states she started having sharp epigastric pain around lunch time today.  Last BM was this morning. Denies nausea or vomiting.       Pt ambulatory from [facility] with steady gait.  Pt states he procedures were done at [place].  Pt states she does have a cardiac history and has a defibrillator. Abdominal pain protocol ordered.  EKG ordered in triage.      Pt is alert and oriented, speaking in full sentences, follows commands and responds appropriately to questions. Resp are even and unlabored.      Pt placed in [facility]. Pt educated on triage process. Pt encouraged to alert staff for any changes.     Patient and staff wearing appropriate PPE.    /63   Pulse 73   Temp 36.2 °C (97.2 °F) (Temporal)   Resp 18   Ht 1.626 m (5' 4\")   Wt 71.8 kg (158 lb 4.6 oz)   SpO2 95%      "

## 2024-05-28 ENCOUNTER — APPOINTMENT (OUTPATIENT)
Dept: RADIOLOGY | Facility: MEDICAL CENTER | Age: 69
End: 2024-05-28
Attending: REGISTERED NURSE
Payer: COMMERCIAL

## 2024-05-28 PROBLEM — R14.0 ABDOMINAL DISTENSION: Status: ACTIVE | Noted: 2024-05-28

## 2024-05-28 PROBLEM — J45.909 ASTHMA: Status: ACTIVE | Noted: 2024-05-28

## 2024-05-28 PROBLEM — R11.0 NAUSEA: Status: ACTIVE | Noted: 2024-05-28

## 2024-05-28 PROBLEM — H40.9 GLAUCOMA: Status: ACTIVE | Noted: 2024-05-28

## 2024-05-28 LAB
ALBUMIN SERPL BCP-MCNC: 4 G/DL (ref 3.2–4.9)
ALBUMIN/GLOB SERPL: 1.5 G/DL
ALP SERPL-CCNC: 85 U/L (ref 30–99)
ALT SERPL-CCNC: 16 U/L (ref 2–50)
ANION GAP SERPL CALC-SCNC: 15 MMOL/L (ref 7–16)
AST SERPL-CCNC: 10 U/L (ref 12–45)
BASOPHILS # BLD AUTO: 0.3 % (ref 0–1.8)
BASOPHILS # BLD: 0.04 K/UL (ref 0–0.12)
BILIRUB SERPL-MCNC: 0.5 MG/DL (ref 0.1–1.5)
BUN SERPL-MCNC: 24 MG/DL (ref 8–22)
CALCIUM ALBUM COR SERPL-MCNC: 9.4 MG/DL (ref 8.5–10.5)
CALCIUM SERPL-MCNC: 9.4 MG/DL (ref 8.5–10.5)
CHLORIDE SERPL-SCNC: 103 MMOL/L (ref 96–112)
CO2 SERPL-SCNC: 21 MMOL/L (ref 20–33)
CREAT SERPL-MCNC: 0.85 MG/DL (ref 0.5–1.4)
EOSINOPHIL # BLD AUTO: 0.07 K/UL (ref 0–0.51)
EOSINOPHIL NFR BLD: 0.5 % (ref 0–6.9)
ERYTHROCYTE [DISTWIDTH] IN BLOOD BY AUTOMATED COUNT: 46.2 FL (ref 35.9–50)
GFR SERPLBLD CREATININE-BSD FMLA CKD-EPI: 74 ML/MIN/1.73 M 2
GLOBULIN SER CALC-MCNC: 2.7 G/DL (ref 1.9–3.5)
GLUCOSE SERPL-MCNC: 146 MG/DL (ref 65–99)
HCT VFR BLD AUTO: 42.9 % (ref 37–47)
HGB BLD-MCNC: 14.3 G/DL (ref 12–16)
IMM GRANULOCYTES # BLD AUTO: 0.05 K/UL (ref 0–0.11)
IMM GRANULOCYTES NFR BLD AUTO: 0.4 % (ref 0–0.9)
LACTATE SERPL-SCNC: 1.3 MMOL/L (ref 0.5–2)
LYMPHOCYTES # BLD AUTO: 1.69 K/UL (ref 1–4.8)
LYMPHOCYTES NFR BLD: 12.6 % (ref 22–41)
MAGNESIUM SERPL-MCNC: 2.1 MG/DL (ref 1.5–2.5)
MCH RBC QN AUTO: 30.6 PG (ref 27–33)
MCHC RBC AUTO-ENTMCNC: 33.3 G/DL (ref 32.2–35.5)
MCV RBC AUTO: 91.9 FL (ref 81.4–97.8)
MONOCYTES # BLD AUTO: 0.77 K/UL (ref 0–0.85)
MONOCYTES NFR BLD AUTO: 5.8 % (ref 0–13.4)
NEUTROPHILS # BLD AUTO: 10.74 K/UL (ref 1.82–7.42)
NEUTROPHILS NFR BLD: 80.4 % (ref 44–72)
NRBC # BLD AUTO: 0 K/UL
NRBC BLD-RTO: 0 /100 WBC (ref 0–0.2)
NT-PROBNP SERPL IA-MCNC: 560 PG/ML (ref 0–125)
PHOSPHATE SERPL-MCNC: 4.2 MG/DL (ref 2.5–4.5)
PLATELET # BLD AUTO: 365 K/UL (ref 164–446)
PMV BLD AUTO: 9.7 FL (ref 9–12.9)
POTASSIUM SERPL-SCNC: 4.1 MMOL/L (ref 3.6–5.5)
PROT SERPL-MCNC: 6.7 G/DL (ref 6–8.2)
RBC # BLD AUTO: 4.67 M/UL (ref 4.2–5.4)
SODIUM SERPL-SCNC: 139 MMOL/L (ref 135–145)
WBC # BLD AUTO: 13.4 K/UL (ref 4.8–10.8)

## 2024-05-28 PROCEDURE — 99233 SBSQ HOSP IP/OBS HIGH 50: CPT | Performed by: STUDENT IN AN ORGANIZED HEALTH CARE EDUCATION/TRAINING PROGRAM

## 2024-05-28 PROCEDURE — 700111 HCHG RX REV CODE 636 W/ 250 OVERRIDE (IP)

## 2024-05-28 PROCEDURE — 99232 SBSQ HOSP IP/OBS MODERATE 35: CPT | Performed by: REGISTERED NURSE

## 2024-05-28 PROCEDURE — 80053 COMPREHEN METABOLIC PANEL: CPT

## 2024-05-28 PROCEDURE — 770001 HCHG ROOM/CARE - MED/SURG/GYN PRIV*

## 2024-05-28 PROCEDURE — 83880 ASSAY OF NATRIURETIC PEPTIDE: CPT

## 2024-05-28 PROCEDURE — 83605 ASSAY OF LACTIC ACID: CPT

## 2024-05-28 PROCEDURE — 700105 HCHG RX REV CODE 258

## 2024-05-28 PROCEDURE — C9113 INJ PANTOPRAZOLE SODIUM, VIA: HCPCS | Performed by: SURGERY

## 2024-05-28 PROCEDURE — 85025 COMPLETE CBC W/AUTO DIFF WBC: CPT

## 2024-05-28 PROCEDURE — A9270 NON-COVERED ITEM OR SERVICE: HCPCS

## 2024-05-28 PROCEDURE — 700111 HCHG RX REV CODE 636 W/ 250 OVERRIDE (IP): Performed by: SURGERY

## 2024-05-28 PROCEDURE — 84100 ASSAY OF PHOSPHORUS: CPT

## 2024-05-28 PROCEDURE — 700101 HCHG RX REV CODE 250

## 2024-05-28 PROCEDURE — 83735 ASSAY OF MAGNESIUM: CPT

## 2024-05-28 PROCEDURE — 700102 HCHG RX REV CODE 250 W/ 637 OVERRIDE(OP)

## 2024-05-28 RX ORDER — MORPHINE SULFATE 4 MG/ML
1 INJECTION INTRAVENOUS ONCE
Status: COMPLETED | OUTPATIENT
Start: 2024-05-28 | End: 2024-05-28

## 2024-05-28 RX ORDER — SODIUM CHLORIDE, SODIUM LACTATE, POTASSIUM CHLORIDE, CALCIUM CHLORIDE 600; 310; 30; 20 MG/100ML; MG/100ML; MG/100ML; MG/100ML
INJECTION, SOLUTION INTRAVENOUS CONTINUOUS
Status: DISCONTINUED | OUTPATIENT
Start: 2024-05-28 | End: 2024-05-29 | Stop reason: ALTCHOICE

## 2024-05-28 RX ADMIN — HEPARIN SODIUM 5000 UNITS: 5000 INJECTION, SOLUTION INTRAVENOUS; SUBCUTANEOUS at 21:12

## 2024-05-28 RX ADMIN — CARVEDILOL 25 MG: 25 TABLET, FILM COATED ORAL at 18:01

## 2024-05-28 RX ADMIN — PRAVASTATIN SODIUM 80 MG: 20 TABLET ORAL at 18:01

## 2024-05-28 RX ADMIN — PANTOPRAZOLE SODIUM 40 MG: 40 INJECTION, POWDER, FOR SOLUTION INTRAVENOUS at 05:06

## 2024-05-28 RX ADMIN — TIMOLOL MALEATE 1 DROP: 5 SOLUTION OPHTHALMIC at 05:06

## 2024-05-28 RX ADMIN — FLUTICASONE FUROATE, UMECLIDINIUM BROMIDE AND VILANTEROL TRIFENATATE 1 PUFF: 200; 62.5; 25 POWDER RESPIRATORY (INHALATION) at 05:06

## 2024-05-28 RX ADMIN — SODIUM CHLORIDE, POTASSIUM CHLORIDE, SODIUM LACTATE AND CALCIUM CHLORIDE: 600; 310; 30; 20 INJECTION, SOLUTION INTRAVENOUS at 05:35

## 2024-05-28 RX ADMIN — MAGNESIUM 64 MG (MAGNESIUM CHLORIDE) TABLET,DELAYED RELEASE 64 MG: at 18:02

## 2024-05-28 RX ADMIN — LATANOPROST 1 DROP: 50 SOLUTION OPHTHALMIC at 21:12

## 2024-05-28 RX ADMIN — SACUBITRIL AND VALSARTAN 1 TABLET: 97; 103 TABLET, FILM COATED ORAL at 18:02

## 2024-05-28 RX ADMIN — MORPHINE SULFATE 1 MG: 4 INJECTION INTRAVENOUS at 00:59

## 2024-05-28 RX ADMIN — HEPARIN SODIUM 5000 UNITS: 5000 INJECTION, SOLUTION INTRAVENOUS; SUBCUTANEOUS at 05:05

## 2024-05-28 RX ADMIN — HEPARIN SODIUM 5000 UNITS: 5000 INJECTION, SOLUTION INTRAVENOUS; SUBCUTANEOUS at 15:25

## 2024-05-28 ASSESSMENT — ENCOUNTER SYMPTOMS
PALPITATIONS: 0
WHEEZING: 0
CONSTIPATION: 1
PSYCHIATRIC NEGATIVE: 1
WEIGHT LOSS: 0
ABDOMINAL PAIN: 0
COUGH: 0
NAUSEA: 1
ORTHOPNEA: 0
CHILLS: 0
EYES NEGATIVE: 1
SHORTNESS OF BREATH: 0
HEARTBURN: 0
HEMOPTYSIS: 0
FEVER: 0
BLOOD IN STOOL: 0
MYALGIAS: 1
NEUROLOGICAL NEGATIVE: 1
SPUTUM PRODUCTION: 0
DIARRHEA: 0
VOMITING: 0

## 2024-05-28 ASSESSMENT — PAIN DESCRIPTION - PAIN TYPE
TYPE: ACUTE PAIN

## 2024-05-28 ASSESSMENT — PATIENT HEALTH QUESTIONNAIRE - PHQ9
1. LITTLE INTEREST OR PLEASURE IN DOING THINGS: NOT AT ALL
SUM OF ALL RESPONSES TO PHQ9 QUESTIONS 1 AND 2: 0
2. FEELING DOWN, DEPRESSED, IRRITABLE, OR HOPELESS: NOT AT ALL

## 2024-05-28 ASSESSMENT — FIBROSIS 4 INDEX: FIB4 SCORE: 0.47

## 2024-05-28 NOTE — CONSULTS
DATE OF CONSULTATION:  5/27/2024     REFERRING PHYSICIAN:   ANTONIO Daily     CONSULTING PHYSICIAN:  Edison Grider M.D.     REASON FOR CONSULTATION:  I have been asked by Dr. Daily to see the patient in surgical consultation for evaluation of abdominal pain.    HISTORY OF PRESENT ILLNESS: The patient is a 68 year-old  woman who presents to the Emergency Department with a 10-hour history of off and on epigastric abdominal pain. The pain is associated with nausea. She denies any history of ulcer, gastritis, cholangitis, pancreatitis, hepatitis, or previous abdominal ailments. She underwent robotic Alban's Reversal with Dr. Wilson at [place] 8 weeks ago.  She also has a history of idiopathic cardiomyopathy.  The episodes of pain concerned her for both her heart and she was worried about a possible problem from her recent surgery.  At time of this interview she denies pain.  She states that she had a brief episode of nausea around the onset of pain and when she got to the ED, but both her pain and nausea are completely resolved with pain medications.  She denies vomiting at any point today.  Her last bowel movement was normal this AM.  She has been having regular daily bowel movements since her surgery.     PAST MEDICAL HISTORY:   Idiopathic cardiomyopathy  CHF  S/P AICD/Pacer    PAST SURGICAL HISTORY:   Alban's for perforated diverticulitis  Alban's reversal March-2024 Dr. Wilson  Tubal ligation  Hysterectomy    ALLERGIES:   Allergies   Allergen Reactions    Shellfish-Derived Products Anaphylaxis    Sulfa Drugs Itching       CURRENT MEDICATIONS:    Home Medications       Reviewed by Cindy Carballo (Pharmacy Tech) on 05/27/24 at 2003  Med List Status: Complete     Medication Last Dose Status   albuterol 108 (90 Base) MCG/ACT Aero Soln inhalation aerosol PRN Active   aspirin (ASA) 81 MG Chew Tab chewable tablet 5/27/2024 Active   bimatoprost (LUMIGAN) 0.01 % Solution 5/26/2024 Active   CALCIUM-VITAMIN D  PO 5/27/2024 Active   carvedilol (COREG) 25 MG Tab 5/27/2024 Active   ENTRESTO  MG Tab 5/27/2024 Active   fluticasone furoate-vilanterol (BREO ELLIPTA) 100-25 MCG/ACT AEROSOL POWDER, BREATH ACTIVATED 5/27/2024 Active   furosemide (LASIX) 20 MG Tab 5/27/2024 Active   magnesium chloride (MAG64) 64 MG Tablet Delayed Response 5/27/2024 Active   montelukast (SINGULAIR) 10 MG Tab 5/27/2024 Active   omeprazole (PRILOSEC) 40 MG delayed-release capsule 5/27/2024 Active   pravastatin (PRAVACHOL) 80 MG tablet 5/26/2024 Active   spironolactone (ALDACTONE) 25 MG Tab 5/27/2024 Active   timolol (BETIMOL) 0.5 % ophthalmic solution 5/27/2024 Active   TRELEGY ELLIPTA 200-62.5-25 MCG/ACT inhaler 5/27/2024 Active                  Audit from Redirected Encounters    **Home medications have not yet been reviewed for this encounter**         FAMILY HISTORY: family history is not on file.    SOCIAL HISTORY:  reports that she has never smoked. She has never used smokeless tobacco. She reports that she does not drink alcohol and does not use drugs.        PHYSICAL EXAMINATION:    Physical Exam  Vitals and nursing note reviewed. Exam conducted with a chaperone present.   Constitutional:       General: She is not in acute distress.     Appearance: Normal appearance. She is normal weight. She is not ill-appearing or toxic-appearing.   HENT:      Head: Atraumatic.      Right Ear: External ear normal.      Left Ear: External ear normal.      Nose: Nose normal.      Mouth/Throat:      Mouth: Mucous membranes are moist.      Pharynx: Oropharynx is clear. No posterior oropharyngeal erythema.   Eyes:      Conjunctiva/sclera: Conjunctivae normal.   Cardiovascular:      Rate and Rhythm: Normal rate and regular rhythm.      Pulses: Normal pulses.   Pulmonary:      Effort: Pulmonary effort is normal. No respiratory distress.      Breath sounds: Normal breath sounds. No wheezing.   Abdominal:      General: Abdomen is flat. There is no distension.       Palpations: Abdomen is soft.      Tenderness: There is no guarding.      Comments: Minimal focal right lower abdominal tenderness   Musculoskeletal:         General: No swelling or tenderness.      Right lower leg: No edema.      Left lower leg: No edema.   Skin:     General: Skin is warm and dry.      Capillary Refill: Capillary refill takes less than 2 seconds.      Coloration: Skin is not jaundiced.   Neurological:      General: No focal deficit present.      Mental Status: She is alert and oriented to person, place, and time.   Psychiatric:         Mood and Affect: Mood normal.         LABORATORY VALUES:   Recent Labs     05/27/24  1709   WBC 11.7*   RBC 4.36   HEMOGLOBIN 13.8   HEMATOCRIT 40.0   MCV 91.7   MCH 31.7   MCHC 34.5   RDW 46.1   PLATELETCT 349   MPV 9.5     Recent Labs     05/25/24  0708 05/27/24  1709   SODIUM 139 138   POTASSIUM 4.1 4.2   CHLORIDE 103 105   CO2 25 20   GLUCOSE 154* 172*   BUN 24* 24*   CREATININE 0.79 0.98   CALCIUM 9.7 9.1     Recent Labs     05/25/24  0708 05/27/24  1709   ASTSGOT 14 13   ALTSGPT 15 10   TBILIRUBIN 0.5 0.5   ALKPHOSPHAT 83 86   GLOBULIN 2.8 2.5            IMAGING:   CT-ABDOMEN-PELVIS WITH   Final Result      1.  Dilated fluid-filled small bowel loops in the lower abdomen suggesting internal hernia and possible developing closed-loop obstruction.   2.  No evidence of bowel perforation.         DX-CHEST-PORTABLE (1 VIEW)   Final Result      1.  Minimal LEFT pleural fluid versus pleural reactive change.   2.  Note pneumonia or overt pulmonary edema.          ASSESSMENT AND PLAN:     69 yo female that underwent Alban's for perforated diverticulitis then subsequent robotic reversal in March with Dr. Wilson.  Post-op course was unremarkable.  10 hours ago she noticed episodic epigastric pain that radiated around to her back.  She has had a few bouts of nausea but no vomiting and daily normal bowel movements including this morning.  He lab workup is essentially  normal, including lactate.  A CT scan demonstrates a segment of distended bowel with decompressed bowel proximally and distally which could represent internal hernia    - Her workup and exam are reassuring and are not concordant with the CT findings, especially if her symptoms have been ongoing for nearly 10 hours  - It is reasonable to trial close observation tonight with trending both her WBC and lactate  - NPO and IV hydration, as she has never vomited an NGT is not required at this time  - I do not recommend antibiotics   - DVT prophylaxis is okay  - If her exam or serial labs worsen she will be indicated for prompt surgical intervention        DISPOSITION: Medical evaluation and admission. The patient was admitted to the Medical Service prior to surgical consultation. [facility] Service will follow.     ____________________________________     Edison Grider M.D.    DD: 5/27/2024  9:11 PM    AAST Grading System for EGS Conditions  ACS NSQIP Surgical Risk Calculator

## 2024-05-28 NOTE — PROGRESS NOTES
4 Eyes Skin Assessment Completed by YAMINI Dc and YAMINI Suarez.    Head WDL  Ears WDL  Nose WDL  Mouth WDL  Neck WDL  Breast/Chest WDL  Shoulder Blades Redness and Blanching  Spine Redness and Blanching  (R) Arm/Elbow/Hand WDL  (L) Arm/Elbow/Hand WDL  Abdomen WDL surgical scar  Groin WDL  Scrotum/Coccyx/Buttocks WDL  (R) Leg WDL  (L) Leg WDL  (R) Heel/Foot/Toe WDL  (L) Heel/Foot/Toe WDL          Devices In Places Tele Box and Blood Pressure Cuff      Interventions In Place Pillows    Possible Skin Injury No    Pictures Uploaded Into Epic N/A  Wound Consult Placed N/A  RN Wound Prevention Protocol Ordered No

## 2024-05-28 NOTE — PROGRESS NOTES
Telemetry Report:    Rhythm: SR  Heart Rate: 82 to 87  Ectopy: F) PVC, R) Coup, Bigeminy    WV: 0.20  QRS: 0.10  QT: 0.33      Per telemetry room monitor

## 2024-05-28 NOTE — ED PROVIDER NOTES
ED Provider Note    CHIEF COMPLAINT  Chief Complaint   Patient presents with    Abdominal Pain     Pt states she had a reversal of a colostomy done in March.  Pt states she started having sharp epigastric pain around lunch time today.  Last BM was this morning. Denies nausea or vomiting.          [facility]    Primary care provider: Cornelius Zelaya M.D.   History obtained from: Patient  History limited by: None     Bernice Vo is a 68 y.o. female who presents to the ED with son complaining of sudden onset of sharp epigastric pain before having lunch today.  She states that she had reversal of colostomy March of this year at [facility] with Dr. Wilson.  She denies recent illness/fever/cough/shortness of breath or difficulty breathing/chest pain.  She reports perhaps slight nausea currently but no vomiting.  She reports normal bowel movements and denies hematochezia or melena.  She denies dysuria or hematuria.  No rash.  She reports that the pain radiates slightly to the right side of her back.    REVIEW OF SYSTEMS  Please see HPI for pertinent positives/negatives.  All other systems reviewed and are negative.     PAST MEDICAL HISTORY  No past medical history on file.     SURGICAL HISTORY  History reviewed. No pertinent surgical history.     SOCIAL HISTORY  Social History     Tobacco Use    Smoking status: Never    Smokeless tobacco: Never   Vaping Use    Vaping status: Never Used   Substance and Sexual Activity    Alcohol use: Never    Drug use: Never    Sexual activity: Not on file        FAMILY HISTORY  History reviewed. No pertinent family history.     CURRENT MEDICATIONS  Home Medications       Reviewed by Cindy Carballo (Pharmacy Tech) on 05/27/24 at 2003  Med List Status: Complete     Medication Last Dose Status   albuterol 108 (90 Base) MCG/ACT Aero Soln inhalation aerosol PRN Active   aspirin (ASA) 81 MG Chew Tab chewable tablet 5/27/2024 Active  "  bimatoprost (LUMIGAN) 0.01 % Solution 5/26/2024 Active   CALCIUM-VITAMIN D PO 5/27/2024 Active   carvedilol (COREG) 25 MG Tab 5/27/2024 Active   ENTRESTO  MG Tab 5/27/2024 Active   fluticasone furoate-vilanterol (BREO ELLIPTA) 100-25 MCG/ACT AEROSOL POWDER, BREATH ACTIVATED 5/27/2024 Active   furosemide (LASIX) 20 MG Tab 5/27/2024 Active   magnesium chloride (MAG64) 64 MG Tablet Delayed Response 5/27/2024 Active   montelukast (SINGULAIR) 10 MG Tab 5/27/2024 Active   omeprazole (PRILOSEC) 40 MG delayed-release capsule 5/27/2024 Active   pravastatin (PRAVACHOL) 80 MG tablet 5/26/2024 Active   spironolactone (ALDACTONE) 25 MG Tab 5/27/2024 Active   timolol (BETIMOL) 0.5 % ophthalmic solution 5/27/2024 Active   TRELEGY ELLIPTA 200-62.5-25 MCG/ACT inhaler 5/27/2024 Active                  Audit from Redirected Encounters    **Home medications have not yet been reviewed for this encounter**          ALLERGIES  Allergies   Allergen Reactions    Shellfish-Derived Products Anaphylaxis    Sulfa Drugs Itching        PHYSICAL EXAM  VITAL SIGNS: /59   Pulse 91   Temp 36.1 °C (97 °F) (Temporal)   Resp 18   Ht 1.626 m (5' 4.02\")   Wt 71.7 kg (158 lb 1.1 oz)   SpO2 93%   BMI 27.12 kg/m²  @JOSE EDUARDO[[id number]::@     Pulse ox interpretation: 93% I interpret this pulse ox as normal     Constitutional: Well developed, well nourished, alert in no apparent distress, nontoxic appearance    HENT: No external signs of trauma, normocephalic  Eyes: PERRL, conjunctiva without erythema, no discharge, no icterus    Neck: Soft and supple, trachea midline, no stridor, no tenderness, no LAD, good ROM    Cardiovascular: Regular rate with ectopic beats, no murmurs/rubs/gallops, strong distal pulses and good perfusion    Thorax & Lungs: No respiratory distress, CTAB    Abdomen: Soft, mild tenderness across upper abdomen, nondistended, no guarding, no rebound, normal BS    Back: Mild right CVAT     Extremities: No cyanosis, no edema, no " gross deformity, good ROM, intact distal pulses with brisk cap refill    Skin: Warm, dry, no pallor/cyanosis, no rash noted      Neuro: A/O times 3, no focal deficits noted    Psychiatric: Cooperative, normal mood and affect, normal judgement, appropriate for clinical situation        DIAGNOSTIC STUDIES / PROCEDURES    EKG  12 Lead EKG obtained at 1656 and interpreted by me:   Rate: 80   Rhythm: Sinus rhythm   Ectopy: PVCs  Intervals: Normal   Axis: LAD  QRS: Late precordial R wave transition  ST segments: Minimal ST depression lateral leads  T Waves: Normal    Clinical Impression: Sinus rhythm with PVCs and nonspecific ST changes       LABS  All labs reviewed by me.     Results for orders placed or performed during the hospital encounter of 05/27/24   CBC with Differential   Result Value Ref Range    WBC 11.7 (H) 4.8 - 10.8 K/uL    RBC 4.36 4.20 - 5.40 M/uL    Hemoglobin 13.8 12.0 - 16.0 g/dL    Hematocrit 40.0 37.0 - 47.0 %    MCV 91.7 81.4 - 97.8 fL    MCH 31.7 27.0 - 33.0 pg    MCHC 34.5 32.2 - 35.5 g/dL    RDW 46.1 35.9 - 50.0 fL    Platelet Count 349 164 - 446 K/uL    MPV 9.5 9.0 - 12.9 fL    Neutrophils-Polys 75.10 (H) 44.00 - 72.00 %    Lymphocytes 16.10 (L) 22.00 - 41.00 %    Monocytes 6.80 0.00 - 13.40 %    Eosinophils 1.40 0.00 - 6.90 %    Basophils 0.30 0.00 - 1.80 %    Immature Granulocytes 0.30 0.00 - 0.90 %    Nucleated RBC 0.00 0.00 - 0.20 /100 WBC    Neutrophils (Absolute) 8.78 (H) 1.82 - 7.42 K/uL    Lymphs (Absolute) 1.88 1.00 - 4.80 K/uL    Monos (Absolute) 0.79 0.00 - 0.85 K/uL    Eos (Absolute) 0.16 0.00 - 0.51 K/uL    Baso (Absolute) 0.04 0.00 - 0.12 K/uL    Immature Granulocytes (abs) 0.04 0.00 - 0.11 K/uL    NRBC (Absolute) 0.00 K/uL   Complete Metabolic Panel   Result Value Ref Range    Sodium 138 135 - 145 mmol/L    Potassium 4.2 3.6 - 5.5 mmol/L    Chloride 105 96 - 112 mmol/L    Co2 20 20 - 33 mmol/L    Anion Gap 13.0 7.0 - 16.0    Glucose 172 (H) 65 - 99 mg/dL    Bun 24 (H) 8 - 22 mg/dL     Creatinine 0.98 0.50 - 1.40 mg/dL    Calcium 9.1 8.5 - 10.5 mg/dL    Correct Calcium 9.0 8.5 - 10.5 mg/dL    AST(SGOT) 13 12 - 45 U/L    ALT(SGPT) 10 2 - 50 U/L    Alkaline Phosphatase 86 30 - 99 U/L    Total Bilirubin 0.5 0.1 - 1.5 mg/dL    Albumin 4.1 3.2 - 4.9 g/dL    Total Protein 6.6 6.0 - 8.2 g/dL    Globulin 2.5 1.9 - 3.5 g/dL    A-G Ratio 1.6 g/dL   Lipase   Result Value Ref Range    Lipase 31 11 - 82 U/L   Urinalysis    Specimen: Urine   Result Value Ref Range    Color Yellow     Character Clear     Specific Gravity 1.025 <1.035    Ph 5.5 5.0 - 8.0    Glucose Negative Negative mg/dL    Ketones Negative Negative mg/dL    Protein Negative Negative mg/dL    Bilirubin Negative Negative    Urobilinogen, Urine 0.2 Negative    Nitrite Negative Negative    Leukocyte Esterase Moderate (A) Negative    Occult Blood Negative Negative    Micro Urine Req Microscopic    Troponin   Result Value Ref Range    Troponin T <6 6 - 19 ng/L   ESTIMATED GFR   Result Value Ref Range    GFR (CKD-EPI) 63 >60 mL/min/1.73 m 2   URINE MICROSCOPIC (W/UA)   Result Value Ref Range    WBC 20-50 (A) /hpf    RBC 0-2 /hpf    Bacteria Negative None /hpf    Epithelial Cells Few /hpf    Hyaline Cast 3-5 (A) /lpf   LACTIC ACID   Result Value Ref Range    Lactic Acid 1.2 0.5 - 2.0 mmol/L   EKG   Result Value Ref Range    Report       [facility] Emergency Dept.    Test Date:  2024  Pt Name:    ANAYA CASTRO                 Department: ER  MRN:        [id number]                      Room:  Gender:     Female                       Technician: [id number]  :        [id number]                   Requested By:ER TRIAGE PROTOCOL  Order #:    [id number]                    Georgie WAGNER:    Measurements  Intervals                                Axis  Rate:       80                           P:          41  IL:         176                          QRS:        -25  QRSD:       107                          T:          69  QT:         374  QTc:         432    Interpretive Statements  Sinus rhythm  Multiple ventricular premature complexes  Probable left atrial enlargement  Borderline left axis deviation  No previous ECG available for comparison          RADIOLOGY  I have independently interpreted the diagnostic imaging associated with this visit and am waiting the final reading from the radiologist.   My preliminary interpretation is as follows: Pacemaker in place and cardiomegaly on chest x-ray.    CT-ABDOMEN-PELVIS WITH   Final Result      1.  Dilated fluid-filled small bowel loops in the lower abdomen suggesting internal hernia and possible developing closed-loop obstruction.   2.  No evidence of bowel perforation.         DX-CHEST-PORTABLE (1 VIEW)   Final Result      1.  Minimal LEFT pleural fluid versus pleural reactive change.   2.  Note pneumonia or overt pulmonary edema.             COURSE & MEDICAL DECISION MAKING  Nursing notes, VS, PMSFHx reviewed in chart.     Review of past medical records shows the patient was seen in the office by cardiology on May 6, 2024 regarding shortness of breath.  Patient had robotic Alban's reversal closure of enterostomy with resection and colorectal anastomosis at [facility] on March 21, 2024.      Differential diagnoses considered include but are not limited to: AMI, AAA, bowel perforation/obstruction, ileus, cholecystitis/ascending cholangitis, gallstone/biliary colic, mesenteric ischemia, pancreatitis, PUD, gastritis, GERD, colitis, muscle strain      ED Observation Status? No; Patient does not meet criteria for ED Observation.       Discussion of management with other [facility] or appropriate source(s): Surgeon and hospitalist      1910: D/W Dr. Grider, on-call surgeon for Dr. Wilson.  He does not recommend surgical intervention at this time based on the findings.  Patient can be admitted by hospitalist for close observation and they will follow    1919: D/W hospitalist who will admit patient      History and  physical exam as above.  Given patient's age, medical history and location of her pain, EKG was obtained which shows sinus rhythm with PVCs and nonspecific ST changes.  Initial troponin without elevation.  Doubt that this is cardiac etiology.  Chest x-ray without acute findings.  Laboratory testing shows mild leukocytosis and patient afebrile in the ED.  No electrolyte derangement.  She has hyperglycemia without evidence for DKA.  No evidence for renal or hepatic dysfunction.  UA with moderate leukocyte esterase and 20-50 WBC but patient denies dysuria and this is unlikely to be acute UTI.  CT abdomen/pelvis with findings as above.  Surgeon was consulted with above recommendations.  I discussed the findings and plan for admission with patient and her family and they are agreeable.  She reports that her symptoms are improving after Zofran and morphine and no vomiting in the ED.  She does not have any guarding or peritoneal signs to suggest need for emergent surgical intervention.  I discussed with the hospitalist who graciously agreed to admit patient.      FINAL IMPRESSION  1. Upper abdominal pain Acute   2. Obstructed internal hernia Acute          DISPOSITION  Patient will be admitted by hospitalist for further care      Electronically signed by: Rudy Daily D.O., 5/27/2024 5:58 PM      Portions of this record were made with voice recognition software.  Despite my review, errors may remain.  Please interpret this chart in the appropriate context.

## 2024-05-28 NOTE — ASSESSMENT & PLAN NOTE
No signs and symptoms as well as imaging findings concerning for infection  Patient with no vomiting, last bowel movement was morning prior to arrival at the ED  S/p robotic Alban's reversal closure of enterostomy with resection and colorectal anastomosis at [facility] last March 21, 2024  Abdominal CT with findings suggestive of obstruction  Bowel series unremarkable  Resolved  -Surgery recommended to resumed clear liquid, advance as tolerated  -No surgical intervention needed at this time  -Discontinued IVF

## 2024-05-28 NOTE — PROGRESS NOTES
Chart reviewed, patient seen again this morning.  WBC has mildly increased and lactate remains normal.  She is stoic but denies any significant pain or nausea overnight.  NGT was attempted per primary team but again I do not think it is necessary as she has never vomited.  She has no tenderness on exam this morning and has passed small amounts of flatus overnight.  Dr. Che to assume care this morning, defer options of continued bowel rest vs SBFT vs liquid diet trial to him.  If her condition worsens please notify the general surgery.      Edison Grider MD

## 2024-05-28 NOTE — ED NOTES
Pharmacy Medication Reconciliation      ~Medication reconciliation updated and complete per patient at bedside with medication list. Reviewed list with patient and returned at bedside   ~Allergies have been verified and updated   ~No oral ABX within the last 30 days  ~Patient home pharmacy :  [facility]      ~Patient reports that she has her Entresto at bedside     ~Anticoagulants (rivaroxaban, apixaban, edoxaban, dabigatran, warfarin, enoxaparin) taken in the last 14 days? No

## 2024-05-28 NOTE — PROGRESS NOTES
"  DATE: 5/28/2024    Hospital Day 2  abdominal pain s/p Alban's Reversal with  in March .    INTERVAL EVENTS:  Denies abdominal pain, passing flatus.  SBFT pending.    PHYSICAL EXAMINATION:  Vital Signs: BP 98/61   Pulse (!) 52   Temp 36.1 °C (97 °F) (Temporal)   Resp 17   Ht 1.626 m (5' 4.02\")   Wt 72.6 kg (160 lb 0.9 oz)   SpO2 93%     Abdomen round, protuberant, soft, non tender.    LABORATORY VALUES:  Recent Labs     05/27/24  1709 05/28/24 0224   WBC 11.7* 13.4*   RBC 4.36 4.67   HEMOGLOBIN 13.8 14.3   HEMATOCRIT 40.0 42.9   MCV 91.7 91.9   MCH 31.7 30.6   MCHC 34.5 33.3   RDW 46.1 46.2   PLATELETCT 349 365   MPV 9.5 9.7     Recent Labs     05/27/24 1709 05/28/24 0224   SODIUM 138 139   POTASSIUM 4.2 4.1   CHLORIDE 105 103   CO2 20 21   GLUCOSE 172* 146*   BUN 24* 24*   CREATININE 0.98 0.85   CALCIUM 9.1 9.4     Recent Labs     05/27/24  1709 05/28/24  0224   ASTSGOT 13 10*   ALTSGPT 10 16   TBILIRUBIN 0.5 0.5   ALKPHOSPHAT 86 85   GLOBULIN 2.5 2.7            IMAGING:  CT-ABDOMEN-PELVIS WITH   Final Result      1.  Dilated fluid-filled small bowel loops in the lower abdomen suggesting internal hernia and possible developing closed-loop obstruction.   2.  No evidence of bowel perforation.         DX-CHEST-PORTABLE (1 VIEW)   Final Result      1.  Minimal LEFT pleural fluid versus pleural reactive change.   2.  Note pneumonia or overt pulmonary edema.      DX-UPPER GI-SMALL BOWEL FOLLOW THRU    (Results Pending)       ASSESSMENT AND PLAN:  69 yo Female with abdominal pain.  SBFT pending         ____________________________________     MARIZOL Serna    DD: 5/28/2024  12:39 PM   "

## 2024-05-28 NOTE — CARE PLAN
The patient is Stable - Low risk of patient condition declining or worsening    Shift Goals  Clinical Goals: imaging, understand plan of care  Patient Goals: rest  Family Goals: get better    Progress made toward(s) clinical / shift goals:    Problem: Pain - Standard  Goal: Alleviation of pain or a reduction in pain to the patient’s comfort goal  Description: Target End Date:  Prior to discharge or change in level of care    Document on Vitals flowsheet    1.  Document pain using the appropriate pain scale per order or unit policy  2.  Educate and implement non-pharmacologic comfort measures (i.e. relaxation, distraction, massage, cold/heat therapy, etc.)  3.  Pain management medications as ordered  4.  Reassess pain after pain med administration per policy  5.  If opiods administered assess patient's response to pain medication is appropriate per POSS sedation scale  6.  Follow pain management plan developed in collaboration with patient and interdisciplinary team (including palliative care or pain specialists if applicable)  5/28/2024 1635 by Shanna Ramirez R.N.  Outcome: Progressing  Note: Pt denying any pain this shift. Pt is resting while family is at bedside.  5/28/2024 1634 by Shanna Ramirez R.N.  Outcome: Progressing     Problem: Psychosocial  Goal: Patient's level of anxiety will decrease  Description: Target End Date:  1-3 days or as soon as patient condition allows    1.  Collaborate with patient and family/caregiver to identify triggers and develop strategies to cope with anxiety  2.  Implement stimuli reduction, calming techniques  3.  Pharmacologic management per provider order  4.  Encourage patient/family/care giver participation  5.  Collaborate with interdisciplinary team including Psychologist or Behavioral Health Team as needed  Outcome: Progressing  Note: Pt resting comfortably with family at bedside. Pt waiting for imaging to decide plan of care.       Patient is not progressing towards  the following goals:

## 2024-05-28 NOTE — H&P
[facility] Internal Medicine History & Physical Note    Date of Service  5/28/2024    Attending: Bina Domingo M.d.   Resident: Dr. Ayala    Contact Number: [phone]    Primary Care Physician  Cornelius Zelaya M.D.    Consultants  surgery    Specialist Names:   Dr. Grider,     Code Status  Full Code    Chief Complaint  Chief Complaint   Patient presents with    Abdominal Pain     Pt states she had a reversal of a colostomy done in March.  Pt states she started having sharp epigastric pain around lunch time today.  Last BM was this morning. Denies nausea or vomiting.         History of Presenting Illness (HPI): Bernice Vo is a 68 y.o. female who presented 5/27/2024 with abdominal pain .  She has past medical history of idiopathic cardiomyopathy,CHF, with most recent echo 2024 showing ejection fraction 50 to 55%, grade 1 diastolic dysfunction(follows with cardiology Dr. Razo), history of arrhythmia with AICD, Asthma.    Patient presented to the emergency department with son with complaints of sudden onset epigastric to mid abdominal pain, which patient describes as sharp, rated 10/10, localized, intermittent, which she has felt before eating lunch today.  Patient denies any previous abdominal pain, denies any nausea, vomiting. Patient had history of perforated diverticulitis 2023 , and she had recent reversal of colostomy March of this year at [facility] with Dr. Wilson.  Patient thought that when she ate , pain would be relieved,however after eating lunch, pain did not subside.  Patient otherwise does not note any specific aggravating or relieving factors.  Patient states that she had last bowel movement this morning, denies any change in color or consistency of stool, denies any hematochezia or melena.  Patient denies any dysuria, fever or chills.  Does not recall any other surgeries on her abdomen.  Also does not recall any change in her diet, did not eat anything out of ordinary.     Patient denies any  chest pain, shortness of breath, palpitations. States her current pain is 7/10, does report minimal nausea during history taking, but still with no vomiting.    In the emergency department, patient noted to be afebrile, pulse 73, RR 18, normotensive at 112/63, saturating well on room air.  Labs with WBC of 11.7, lipase normal at 31, negative troponin, glucose 172, creatinine 0.98, BUN 24, lactic acid normal at 1.2. Urinalysis with moderate leukocyte esterase.   CT of the abdomen reveals dilated fluid-filled small bowel loops in the lower abdomen suggesting internal hernia and possible developing closed-loop obstruction per read, no evidence of bowel perforation.CXR with minimal LEFT pleural fluid versus pleural reactive change.     Surgery on-call, Dr. Grider was contacted by ERP.  Dr. Grider does not recommend surgical intervention at this time based on the findings.    I discussed the plan of care with patient, family, and bedside RN.    Review of Systems  Review of Systems   Constitutional: Negative.    HENT: Negative.     Eyes: Negative.    Respiratory: Negative.     Cardiovascular: Negative.    Genitourinary:  Negative for dysuria and hematuria.   Musculoskeletal: Negative.    Skin: Negative.    Neurological: Negative.    Endo/Heme/Allergies: Negative.    Psychiatric/Behavioral: Negative.         Past Medical History   has no past medical history on file.    Surgical History   has no past surgical history on file.     Family History  family history is not on file.   Family history reviewed with patient.     Social History  Tobacco: Non-smoker   Alcohol: nonalcoholic beverage drinker  Recreational drugs (illegal or prescription): Denies illicit drug use  Employment: Retired  Living Situation: Lives with family  Recent Travel: Denies recent travel  Primary Care Provider: Reviewed      Allergies  Allergies   Allergen Reactions    Shellfish-Derived Products Anaphylaxis    Sulfa Drugs Itching        Medications  Prior to Admission Medications   Prescriptions Last Dose Informant Patient Reported? Taking?   CALCIUM-VITAMIN D PO   Yes No   Sig: Take 1 Tablet by mouth every morning.   ENTRESTO  MG Tab   Yes No   Sig: Take 1 Tablet by mouth 2 times a day.   TRELEGY ELLIPTA 200-62.5-25 MCG/ACT inhaler   Yes Yes   Sig: Inhale 1 Puff every morning.   albuterol 108 (90 Base) MCG/ACT Aero Soln inhalation aerosol   Yes Yes   Sig: Inhale 1-2 Puffs 2 times a day as needed for Shortness of Breath.   bimatoprost (LUMIGAN) 0.01 % Solution   Yes No   Sig: Administer 1 Drop into both eyes every evening.   carvedilol (COREG) 25 MG Tab   Yes Yes   Sig: Take 25 mg by mouth 2 times a day.   fluticasone furoate-vilanterol (BREO ELLIPTA) 100-25 MCG/ACT AEROSOL POWDER, BREATH ACTIVATED   Yes No   Sig: Inhale 1 Puff every morning.   furosemide (LASIX) 20 MG Tab   Yes No   Sig: Take 20 mg by mouth every morning.   magnesium chloride (MAG64) 64 MG Tablet Delayed Response   Yes Yes   Sig: Take 64 mg by mouth 2 times a day.   montelukast (SINGULAIR) 10 MG Tab   Yes No   Sig: Take 10 mg by mouth every morning.   omeprazole (PRILOSEC) 40 MG delayed-release capsule   Yes No   Sig: Take 40 mg by mouth every morning.   pravastatin (PRAVACHOL) 80 MG tablet   Yes No   Sig: Take 80 mg by mouth every evening.   spironolactone (ALDACTONE) 25 MG Tab   Yes Yes   Sig: Take 25 mg by mouth every morning.   timolol (BETIMOL) 0.5 % ophthalmic solution   Yes No   Sig: Administer 1 Drop into both eyes every morning.      Facility-Administered Medications: None       Physical Exam  Temp:  [36 °C (96.8 °F)-36.2 °C (97.2 °F)] 36 °C (96.8 °F)  Pulse:  [70-91] 80  Resp:  [16-18] 18  BP: ()/(59-63) 99/61  SpO2:  [92 %-95 %] 92 %  Blood Pressure : 107/59   Temperature: 36.2 °C (97.2 °F)   Pulse: 70   Respiration: 16   Pulse Oximetry: 93 %       Physical Exam  Constitutional:       General: She is not in acute distress.     Appearance: Normal  "appearance. She is not toxic-appearing.   HENT:      Head: Normocephalic.      Nose: Nose normal.      Mouth/Throat:      Mouth: Mucous membranes are moist.   Eyes:      Pupils: Pupils are equal, round, and reactive to light.   Cardiovascular:      Rate and Rhythm: Normal rate and regular rhythm.   Pulmonary:      Effort: Pulmonary effort is normal.      Breath sounds: Normal breath sounds.   Abdominal:      Tenderness: There is abdominal tenderness (mild tenderness on mid epigastric area, no rigidity, no rebound tenderness).      Comments: Normoactive bowel sounds   Genitourinary:     Comments: Not assessed  Musculoskeletal:         General: No swelling.      Cervical back: Normal range of motion.   Skin:     General: Skin is warm.      Capillary Refill: Capillary refill takes less than 2 seconds.   Neurological:      General: No focal deficit present.      Mental Status: She is alert and oriented to person, place, and time.   Psychiatric:         Mood and Affect: Mood normal.         Behavior: Behavior normal.         Laboratory:  Recent Labs     05/27/24  1709   WBC 11.7*   RBC 4.36   HEMOGLOBIN 13.8   HEMATOCRIT 40.0   MCV 91.7   MCH 31.7   MCHC 34.5   RDW 46.1   PLATELETCT 349   MPV 9.5     Recent Labs     05/25/24  0708 05/27/24  1709   SODIUM 139 138   POTASSIUM 4.1 4.2   CHLORIDE 103 105   CO2 25 20   GLUCOSE 154* 172*   BUN 24* 24*   CREATININE 0.79 0.98   CALCIUM 9.7 9.1     Recent Labs     05/25/24  0708 05/27/24  1709   ALTSGPT 15 10   ASTSGOT 14 13   ALKPHOSPHAT 83 86   TBILIRUBIN 0.5 0.5   LIPASE  --  31   GLUCOSE 154* 172*         No results for input(s): \"NTPROBNP\" in the last 72 hours.  Recent Labs     05/25/24  0708   TRIGLYCERIDE 213*   HDL 43   LDL 90     Recent Labs     05/27/24  1709   TROPONINT <6       Imaging:  CT-ABDOMEN-PELVIS WITH   Final Result      1.  Dilated fluid-filled small bowel loops in the lower abdomen suggesting internal hernia and possible developing closed-loop obstruction. "   2.  No evidence of bowel perforation.         DX-CHEST-PORTABLE (1 VIEW)   Final Result      1.  Minimal LEFT pleural fluid versus pleural reactive change.   2.  Note pneumonia or overt pulmonary edema.          X-Ray:  I have personally reviewed the images and compared with prior images.  EKG:  I have personally reviewed the images and compared with prior images.    Assessment/Plan:  Problem Representation:     68-year-old female with history of recent reversal of colostomy who presents with sudden onset epigastric pain, imaging findings concerning for obstruction.     I anticipate this patient will require at least two midnights for appropriate medical management, necessitating inpatient admission because concern for bowel obstruction/telemetry/serial abdominal examination/surgery consult    Patient will need a Med/Surg bed on MEDICAL service .The need is secondary to .will require at least two midnights for appropriate medical management, necessitating inpatient admission because concern for bowel obstruction/telemetry/serial abdominal examination/surgery consult    * Abdominal pain- (present on admission)  Assessment & Plan  -Abdominal CT with findings suggestive of obstruction  -No signs and symptoms as well as imaging findings concerning for infection  -Patient with no vomiting, last bowel movement was morning prior to arrival at the emergency department  -with history of robotic Alban's reversal closure of enterostomy with resection and colorectal anastomosis at [facility] last March 21, 2024  -Surgery following following  -Keep patient n.p.o.  -Serial abdominal examination, no indication for antibiotics    Abdominal distension  Assessment & Plan  -Patient with persistent abdominal pain and findings of abdominal distention  -Serial abdominal examination done, patient agrees with NGT insertion to aid in decompression  -Continue to monitor  -Surgery following, appreciate  recommendations    Nausea  Assessment & Plan  -Given Zofran with relief of symptoms  -QTc 432    Congestive heart failure (CHF) (HCC)  Assessment & Plan  -Currently euvolemic, asymptomatic, most recent echo 2/2024 with ejection fraction  of 50 to 55%  -Continue home medications  -Cautious/minimize IV fluid hydration        VTE prophylaxis: SCDs/TEDs,heparin

## 2024-05-28 NOTE — CARE PLAN
The patient is Stable - Low risk of patient condition declining or worsening    Shift Goals  Clinical Goals: Strict NPO  Patient Goals: get better  Family Goals: get better    Progress made toward(s) clinical / shift goals:    Problem: Pain - Standard  Goal: Alleviation of pain or a reduction in pain to the patient’s comfort goal  Outcome: Progressing  Note: Patient c/o pain throughout the shift. Medicated pt per MAR. Educated pt to call RN if pain occurs.     Problem: Knowledge Deficit - Standard  Goal: Patient and family/care givers will demonstrate understanding of plan of care, disease process/condition, diagnostic tests and medications  Outcome: Progressing  Note: Educated pt on the disease process, medications and plan of care. Pt verbalized understanding of the treatment plan.       Patient is not progressing towards the following goals:

## 2024-05-28 NOTE — PROGRESS NOTES
MEDICAL STUDENT NOTE  This note is intended for the purposes of medical student education and feedback only.   Please refer to the documentation by this patient's assigned medical practitioner for details of care and plans.    INTERNAL MEDICINE PROGRESS NOTE          PATIENT ID:  NAME:  Bernice Vo  MRN:               [id number]  YOB: 1955    Date of Admission: 5/27/2024     Attending: Haleigh Hernandez M.d.     Medical Student: Rosa Brasher    Primary Care Physician:  Dr. Cornelius Zelaya    HPI: Bernice Vo is a 68 y.o. female with PMH of dilated cardiomyopathy, CHF (EF 50-55% in Feb 2024), hx of arrhythmia (AICD), Asthma, and perforated diverticulitis (s/p reversal in March 2024) admitted for epigastric abdominal pain with concerns of partial bowel obstruction on hospital day 1. Per patient, she initially developed epigastric pain yesterday before lunch, proceeded to eat, but the pain persisted. She attempted to lay down, but decided to go to the ED after the pain continued. The patient endorsed some associated nausea, but no vomiting. She denied any fever, chills, dysuria, chest pain, shortness of breath, or palpitations. Her last bowel movement was yesterday morning and was normal, no hematochezia or melena. She has continued to pass gas. The patient denies any diet changes.     ED Course: In the ED, the patient was hemodynamically stable. She had a WBC of 11.7 with left shift. Lipase of 31. Negative Troponin. Creatinine of 0.98. UA showed moderate leukocyte esterase. CT Scan showed dilated fluid filled small bowel loops in the lower abdomen suggesting internal hernia and possible developing closed-loop obstruction, but no perforation. CXR with minimal left pleural fluid vs pleural reactive change.     Interval Problem Update  WBC increased to 13.4    Lactic Acid stable at 1.3    NG tube attempted, but unsuccessful. General surgery will continue to follow and is agreeable with conservative management at  "this time.     I have personally seen and examined the patient at bedside. I discussed the plan of care with patient, bedside RN, , and pharmacy.    SUBJECTIVE:   No acute events overnight, patient denies any pain currently. She continues to pass gas, but has not had a bowel movement this morning.     OBJECTIVE:    PHYSICAL EXAM:  BP 98/61   Pulse (!) 52   Temp 36.1 °C (97 °F) (Temporal)   Resp 17   Ht 1.626 m (5' 4.02\")   Wt 72.6 kg (160 lb 0.9 oz)   SpO2 93%   BMI 27.46 kg/m²   Physical Exam  Constitutional:       Appearance: Normal appearance.   Cardiovascular:      Rate and Rhythm: Normal rate and regular rhythm.   Pulmonary:      Effort: Pulmonary effort is normal.      Breath sounds: Normal breath sounds.   Abdominal:      General: Abdomen is flat. Bowel sounds are normal. There is distension.      Palpations: Abdomen is soft.      Tenderness: There is no guarding or rebound.   Musculoskeletal:         General: No swelling.   Skin:     General: Skin is warm and dry.      Capillary Refill: Capillary refill takes less than 2 seconds.   Neurological:      Mental Status: She is alert and oriented to person, place, and time.   Psychiatric:         Mood and Affect: Mood normal.         Behavior: Behavior normal.         Intake/Output Summary (Last 24 hours) at 5/28/2024 0906  Last data filed at 5/28/2024 0800  Gross per 24 hour   Intake 0 ml   Output 0 ml   Net 0 ml       LABS:  Recent Labs     05/27/24 1709 05/28/24 0224   WBC 11.7* 13.4*   RBC 4.36 4.67   HEMOGLOBIN 13.8 14.3   HEMATOCRIT 40.0 42.9   MCV 91.7 91.9   MCH 31.7 30.6   RDW 46.1 46.2   PLATELETCT 349 365   MPV 9.5 9.7   NEUTSPOLYS 75.10* 80.40*   LYMPHOCYTES 16.10* 12.60*   MONOCYTES 6.80 5.80   EOSINOPHILS 1.40 0.50   BASOPHILS 0.30 0.30     Recent Labs     05/27/24 1709 05/28/24 0224   SODIUM 138 139   POTASSIUM 4.2 4.1   CHLORIDE 105 103   CO2 20 21   BUN 24* 24*   CREATININE 0.98 0.85   CALCIUM 9.1 9.4   MAGNESIUM  --  2.1 "   PHOSPHORUS  --  4.2   ALBUMIN 4.1 4.0     Estimated GFR/CRCL = Estimated Creatinine Clearance: 61.9 mL/min (by C-G formula based on SCr of 0.85 mg/dL).  Recent Labs     05/27/24 1709 05/28/24 0224   GLUCOSE 172* 146*     Recent Labs     05/27/24  1709 05/28/24 0224   ASTSGOT 13 10*   ALTSGPT 10 16   TBILIRUBIN 0.5 0.5   ALKPHOSPHAT 86 85   GLOBULIN 2.5 2.7       IMAGING:  CT-ABDOMEN-PELVIS WITH   Final Result      1.  Dilated fluid-filled small bowel loops in the lower abdomen suggesting internal hernia and possible developing closed-loop obstruction.   2.  No evidence of bowel perforation.         DX-CHEST-PORTABLE (1 VIEW)   Final Result      1.  Minimal LEFT pleural fluid versus pleural reactive change.   2.  Note pneumonia or overt pulmonary edema.          CULTURES:   Results       Procedure Component Value Units Date/Time    Urinalysis [[id number]]  (Abnormal) Collected: 05/27/24 5082    Order Status: Completed Specimen: Urine Updated: 05/27/24 1835     Color Yellow     Character Clear     Specific Gravity 1.025     Ph 5.5     Glucose Negative mg/dL      Ketones Negative mg/dL      Protein Negative mg/dL      Bilirubin Negative     Urobilinogen, Urine 0.2     Nitrite Negative     Leukocyte Esterase Moderate     Occult Blood Negative     Micro Urine Req Microscopic            MEDS:  Current Facility-Administered Medications   Medication Last Admin    lactated ringers infusion New Bag at 05/28/24 0535    heparin injection 5,000 Units 5,000 Units at 05/28/24 0505    carvedilol (Coreg) tablet 25 mg      sacubitril-valsartan (Entresto)  MG 1 Tablet      magnesium chloride (Mag-64) delayed-release tablet 64 mg      montelukast (Singulair) tablet 10 mg      pravastatin (Pravachol) tablet 80 mg      spironolactone (Aldactone) tablet 25 mg      timolol (Timoptic) 0.5 % ophthalmic solution 1 Drop 1 Drop at 05/28/24 0506    fluticasone-umeclidinium-vilanterol (Trelegy Ellipta) 200-62.5-25 mcg/act inhaler 1 Puff  1 Puff at 05/28/24 0506    ondansetron (Zofran) syringe/vial injection 4 mg 4 mg at 05/27/24 2058    latanoprost (Xalatan) 0.005 % ophthalmic solution 1 Drop         ASSESSMENT/PLAN:  68 y.o. female with hx of perforated diverticulitis (s/p House reversal March 2023) admitted for abdominal pain with concerns of obstruction     #Abdominal Pain  Patient with mid abdominal pain, but no vomiting, fevers or chills  CT-Abdomen Pelvis with contrast showing dilated fluid-filled small bowel loops in the lower abdomen suggesting internal hernia and possible developing closed-loop obstruction. No bowel perforation.  General Surgery consulted and appreciate recommendations:   -Keep patient NPO    -NG tube attempted but failed   -Trend WBC, currently WBC of 13.4   -Trend Lactic Acid, currently 1.3   -Antibiotics not indicated   -Plan for SBFT with gastrografin tomorrow      #Nausea  Zofran 4 mg PRN  QTc of 432    #Congested Heart Failure  Followed outpatient by Dr. Razo ([facility])  Last Echo Feb 2024 showing EF of 50-55%  Continue home medications of:   -Carvedilol 25mg PO BID   -Entresto  mg PO BID   -Spirinolactone 25mg PO daily   -Magnesium Chloride 64mg PO BID   -Pravastatin 80mg PO   Troponin negative  BNP elevated at 560    #Asthma  Managed outpatient by PCP  Continue home medications of:   -Trelegy Elipta 1 puff daily   -Montelukast 10 mg PO    #Glaucoma  Managed outpatient by Ophthalmology  Continue home medications of:   -Timolol eye drops   -Latanoprost eye drops    Core Measures:  Fluids: LR maitenance @83mL/hr  Lines: Peripheral IV  Abx: None  Diet: NPO  PPX: heparin ppx    CODE Status: Full Code      Disposition  Patient is not medically cleared for discharge.   Anticipate discharge to to home with close outpatient follow-up.  I have placed the appropriate orders for post-discharge needs.      Rosa Brasher, MS3  [facility]

## 2024-05-28 NOTE — ASSESSMENT & PLAN NOTE
Currently euvolemic, asymptomatic, most recent echo 2/2024 with ejection fraction  of 50 to 55%  -Continue home medications

## 2024-05-28 NOTE — ASSESSMENT & PLAN NOTE
Resolved  Bowel series unremarkable  -Surgery recommended to resumed clear liquid, advance as tolerated  -No surgical intervention needed at this time  -Discontinued IVF

## 2024-05-28 NOTE — PROGRESS NOTES
Attempted NGT insertion to low suction, pt can not tolerate and refused it for now. Dr. Vee Ayala notified.

## 2024-05-29 ENCOUNTER — APPOINTMENT (OUTPATIENT)
Dept: RADIOLOGY | Facility: MEDICAL CENTER | Age: 69
End: 2024-05-29
Attending: REGISTERED NURSE
Payer: COMMERCIAL

## 2024-05-29 VITALS
OXYGEN SATURATION: 91 % | DIASTOLIC BLOOD PRESSURE: 62 MMHG | BODY MASS INDEX: 27.4 KG/M2 | WEIGHT: 160.5 LBS | SYSTOLIC BLOOD PRESSURE: 97 MMHG | RESPIRATION RATE: 18 BRPM | HEART RATE: 81 BPM | HEIGHT: 64 IN | TEMPERATURE: 97.3 F

## 2024-05-29 PROBLEM — R11.0 NAUSEA: Status: RESOLVED | Noted: 2024-05-28 | Resolved: 2024-05-29

## 2024-05-29 PROBLEM — R14.0 ABDOMINAL DISTENSION: Status: RESOLVED | Noted: 2024-05-28 | Resolved: 2024-05-29

## 2024-05-29 PROBLEM — R10.9 ABDOMINAL PAIN: Status: RESOLVED | Noted: 2024-05-27 | Resolved: 2024-05-29

## 2024-05-29 LAB
ALBUMIN SERPL BCP-MCNC: 3.7 G/DL (ref 3.2–4.9)
ALBUMIN/GLOB SERPL: 1.5 G/DL
ALP SERPL-CCNC: 67 U/L (ref 30–99)
ALT SERPL-CCNC: 12 U/L (ref 2–50)
ANION GAP SERPL CALC-SCNC: 11 MMOL/L (ref 7–16)
AST SERPL-CCNC: 12 U/L (ref 12–45)
BASOPHILS # BLD AUTO: 0.5 % (ref 0–1.8)
BASOPHILS # BLD: 0.04 K/UL (ref 0–0.12)
BILIRUB SERPL-MCNC: 0.7 MG/DL (ref 0.1–1.5)
BUN SERPL-MCNC: 25 MG/DL (ref 8–22)
CALCIUM ALBUM COR SERPL-MCNC: 9.1 MG/DL (ref 8.5–10.5)
CALCIUM SERPL-MCNC: 8.9 MG/DL (ref 8.5–10.5)
CHLORIDE SERPL-SCNC: 106 MMOL/L (ref 96–112)
CO2 SERPL-SCNC: 23 MMOL/L (ref 20–33)
CREAT SERPL-MCNC: 0.72 MG/DL (ref 0.5–1.4)
EOSINOPHIL # BLD AUTO: 0.12 K/UL (ref 0–0.51)
EOSINOPHIL NFR BLD: 1.4 % (ref 0–6.9)
ERYTHROCYTE [DISTWIDTH] IN BLOOD BY AUTOMATED COUNT: 46.8 FL (ref 35.9–50)
GFR SERPLBLD CREATININE-BSD FMLA CKD-EPI: 91 ML/MIN/1.73 M 2
GLOBULIN SER CALC-MCNC: 2.5 G/DL (ref 1.9–3.5)
GLUCOSE SERPL-MCNC: 105 MG/DL (ref 65–99)
HCT VFR BLD AUTO: 39.9 % (ref 37–47)
HGB BLD-MCNC: 13.3 G/DL (ref 12–16)
IMM GRANULOCYTES # BLD AUTO: 0.02 K/UL (ref 0–0.11)
IMM GRANULOCYTES NFR BLD AUTO: 0.2 % (ref 0–0.9)
LYMPHOCYTES # BLD AUTO: 2.35 K/UL (ref 1–4.8)
LYMPHOCYTES NFR BLD: 26.7 % (ref 22–41)
MCH RBC QN AUTO: 31.1 PG (ref 27–33)
MCHC RBC AUTO-ENTMCNC: 33.3 G/DL (ref 32.2–35.5)
MCV RBC AUTO: 93.2 FL (ref 81.4–97.8)
MONOCYTES # BLD AUTO: 0.73 K/UL (ref 0–0.85)
MONOCYTES NFR BLD AUTO: 8.3 % (ref 0–13.4)
NEUTROPHILS # BLD AUTO: 5.53 K/UL (ref 1.82–7.42)
NEUTROPHILS NFR BLD: 62.9 % (ref 44–72)
NRBC # BLD AUTO: 0 K/UL
NRBC BLD-RTO: 0 /100 WBC (ref 0–0.2)
PLATELET # BLD AUTO: 320 K/UL (ref 164–446)
PMV BLD AUTO: 9.8 FL (ref 9–12.9)
POTASSIUM SERPL-SCNC: 3.8 MMOL/L (ref 3.6–5.5)
PROT SERPL-MCNC: 6.2 G/DL (ref 6–8.2)
RBC # BLD AUTO: 4.28 M/UL (ref 4.2–5.4)
SODIUM SERPL-SCNC: 140 MMOL/L (ref 135–145)
WBC # BLD AUTO: 8.8 K/UL (ref 4.8–10.8)

## 2024-05-29 PROCEDURE — 99232 SBSQ HOSP IP/OBS MODERATE 35: CPT

## 2024-05-29 PROCEDURE — 700105 HCHG RX REV CODE 258

## 2024-05-29 PROCEDURE — 700102 HCHG RX REV CODE 250 W/ 637 OVERRIDE(OP)

## 2024-05-29 PROCEDURE — 700111 HCHG RX REV CODE 636 W/ 250 OVERRIDE (IP)

## 2024-05-29 PROCEDURE — 85025 COMPLETE CBC W/AUTO DIFF WBC: CPT

## 2024-05-29 PROCEDURE — A9270 NON-COVERED ITEM OR SERVICE: HCPCS

## 2024-05-29 PROCEDURE — 74250 X-RAY XM SM INT 1CNTRST STD: CPT

## 2024-05-29 PROCEDURE — 80053 COMPREHEN METABOLIC PANEL: CPT

## 2024-05-29 PROCEDURE — 99238 HOSP IP/OBS DSCHRG MGMT 30/<: CPT | Performed by: STUDENT IN AN ORGANIZED HEALTH CARE EDUCATION/TRAINING PROGRAM

## 2024-05-29 PROCEDURE — 700117 HCHG RX CONTRAST REV CODE 255: Performed by: REGISTERED NURSE

## 2024-05-29 RX ADMIN — TIMOLOL MALEATE 1 DROP: 5 SOLUTION OPHTHALMIC at 05:58

## 2024-05-29 RX ADMIN — IOHEXOL 300 ML: 350 INJECTION, SOLUTION INTRAVENOUS at 11:30

## 2024-05-29 RX ADMIN — HEPARIN SODIUM 5000 UNITS: 5000 INJECTION, SOLUTION INTRAVENOUS; SUBCUTANEOUS at 05:56

## 2024-05-29 RX ADMIN — SODIUM CHLORIDE, POTASSIUM CHLORIDE, SODIUM LACTATE AND CALCIUM CHLORIDE: 600; 310; 30; 20 INJECTION, SOLUTION INTRAVENOUS at 00:19

## 2024-05-29 RX ADMIN — MAGNESIUM 64 MG (MAGNESIUM CHLORIDE) TABLET,DELAYED RELEASE 64 MG: at 05:58

## 2024-05-29 RX ADMIN — SPIRONOLACTONE 25 MG: 25 TABLET ORAL at 05:57

## 2024-05-29 RX ADMIN — FLUTICASONE FUROATE, UMECLIDINIUM BROMIDE AND VILANTEROL TRIFENATATE 1 PUFF: 200; 62.5; 25 POWDER RESPIRATORY (INHALATION) at 05:58

## 2024-05-29 RX ADMIN — CARVEDILOL 25 MG: 25 TABLET, FILM COATED ORAL at 05:57

## 2024-05-29 RX ADMIN — SACUBITRIL AND VALSARTAN 1 TABLET: 97; 103 TABLET, FILM COATED ORAL at 05:59

## 2024-05-29 ASSESSMENT — FIBROSIS 4 INDEX: FIB4 SCORE: 0.74

## 2024-05-29 ASSESSMENT — PAIN DESCRIPTION - PAIN TYPE: TYPE: ACUTE PAIN

## 2024-05-29 NOTE — DISCHARGE SUMMARY
[facility] Discharge Summary    Attending: Haleigh Hernandez M.d.  Senior Resident: Dr. Chopra  Intern:  Dr. Lopez  Contact Number: [phone]    CHIEF COMPLAINT ON ADMISSION  Chief Complaint   Patient presents with    Abdominal Pain     Pt states she had a reversal of a colostomy done in March.  Pt states she started having sharp epigastric pain around lunch time today.  Last BM was this morning. Denies nausea or vomiting.         Reason for Admission  Abd Pain     Admission Date  5/27/2024    CODE STATUS  Full Code    HPI & HOSPITAL COURSE  Bernice Vo is a 68 y.o. female with PMH of dilated cardiomyopathy, CHF (EF 50-55% in Feb 2024), hx of arrhythmia (AICD), Asthma, and perforated diverticulitis (s/p reversal in March 2024) admitted for epigastric abdominal pain with concerns of partial bowel obstruction on hospital day 1. Per patient, she initially developed epigastric pain yesterday before lunch, proceeded to eat, but the pain persisted. She attempted to lay down, but decided to go to the ED after the pain continued. The patient endorsed some associated nausea, but no vomiting. She denied any fever, chills, dysuria, chest pain, shortness of breath, or palpitations. Her last bowel movement was yesterday morning and was normal, no hematochezia or melena. She has continued to pass gas. The patient denies any diet changes.      In the ED, the patient was hemodynamically stable. She had a WBC of 11.7 with left shift. Lipase of 31. Negative Troponin. Creatinine of 0.98. UA showed moderate leukocyte esterase. CT Scan showed dilated fluid filled small bowel loops in the lower abdomen suggesting internal hernia and possible developing closed-loop obstruction, but no perforation. CXR with minimal left pleural fluid vs pleural reactive change.     On day of discharge, patient denied any abdominal pain. Distension resolved. Has had 2 BM. SB series unremarkable for obstruction, lab work within normal. No  surgical intervention needed. Patient tolerated snacks well.          Therefore, she is discharged in good and stable condition to home with close outpatient follow-up.    The patient met 2-midnight criteria for an inpatient stay at the time of discharge.    Discharge Date  5/29/24    Physical Exam on Day of Discharge  Physical Exam  Constitutional:       Appearance: Normal appearance.   HENT:      Head: Normocephalic and atraumatic.      Mouth/Throat:      Mouth: Mucous membranes are moist.   Eyes:      Extraocular Movements: Extraocular movements intact.      Conjunctiva/sclera: Conjunctivae normal.   Cardiovascular:      Rate and Rhythm: Normal rate.      Pulses: Normal pulses.   Pulmonary:      Effort: Pulmonary effort is normal.      Breath sounds: Normal breath sounds. No wheezing, rhonchi or rales.   Abdominal:      General: Bowel sounds are normal. There is no distension.      Palpations: Abdomen is soft.      Tenderness: There is no abdominal tenderness. There is no guarding or rebound.   Musculoskeletal:         General: Normal range of motion.      Cervical back: Normal range of motion.   Skin:     General: Skin is warm and dry.      Coloration: Skin is not jaundiced.   Neurological:      General: No focal deficit present.      Mental Status: She is alert and oriented to person, place, and time.   Psychiatric:         Mood and Affect: Mood normal.         Behavior: Behavior normal.         Thought Content: Thought content normal.         Judgment: Judgment normal.         FOLLOW UP ITEMS POST DISCHARGE  PCP     DISCHARGE DIAGNOSES  Principal Problem (Resolved):    Abdominal pain (POA: Yes)  Active Problems:    Congestive heart failure (CHF) (HCC) (POA: Yes)    Asthma (POA: Yes)    Glaucoma (POA: Yes)  Resolved Problems:    Abdominal distension (POA: Yes)    Nausea (POA: Yes)      FOLLOW UP  No future appointments.  Cornelius Zelaya M.D.  [address]  [address]  [place]  [id number]  [phone]    Schedule an appointment as soon as possible for a visit  Hospital follow-up      MEDICATIONS ON DISCHARGE     Medication List        CONTINUE taking these medications        Instructions   albuterol 108 (90 Base) MCG/ACT Aers inhalation aerosol   Inhale 1-2 Puffs 2 times a day as needed for Shortness of Breath.  Dose: 1-2 Puff     aspirin 81 MG Chew chewable tablet  Commonly known as: Asa   Chew 81 mg every morning.  Dose: 81 mg     bimatoprost 0.01 % Soln  Commonly known as: Lumigan   Administer 1 Drop into both eyes every evening.  Dose: 1 Drop     Breo Ellipta 100-25 MCG/ACT Aepb  Generic drug: fluticasone furoate-vilanterol   Inhale 1 Puff every morning.  Dose: 1 Puff     CALCIUM-VITAMIN D PO   Take 1 Tablet by mouth every morning.  Dose: 1 Tablet     carvedilol 25 MG Tabs  Commonly known as: Coreg   Take 25 mg by mouth 2 times a day.  Dose: 25 mg     Entresto  MG Tabs  Generic drug: sacubitril-valsartan   Take 1 Tablet by mouth 2 times a day.  Dose: 1 Tablet     furosemide 20 MG Tabs  Commonly known as: Lasix   Take 20 mg by mouth every morning.  Dose: 20 mg     Mag64 64 MG Tbec  Generic drug: magnesium chloride   Take 64 mg by mouth 2 times a day.  Dose: 64 mg     montelukast 10 MG Tabs  Commonly known as: Singulair   Take 10 mg by mouth every morning.  Dose: 10 mg     omeprazole 40 MG delayed-release capsule  Commonly known as: PriLOSEC   Take 40 mg by mouth every morning.  Dose: 40 mg     pravastatin 80 MG tablet  Commonly known as: Pravachol   Take 80 mg by mouth every evening.  Dose: 80 mg     spironolactone 25 MG Tabs  Commonly known as: Aldactone   Take 25 mg by mouth every morning.  Dose: 25 mg     timolol 0.5 % ophthalmic solution  Commonly known as: Betimol   Administer 1 Drop into both eyes every morning.  Dose: 1 Drop     Trelegy Ellipta 200-62.5-25 MCG/ACT inhaler  Generic drug: fluticasone-umeclidinium-vilanterol   Inhale 1 Puff every morning.  Dose: 1 Puff               Allergies  Allergies   Allergen Reactions    Shellfish-Derived Products Anaphylaxis    Sulfa Drugs Itching       DIET  Orders Placed This Encounter   Procedures    Diet Order Diet: Clear Liquid     Standing Status:   Standing     Number of Occurrences:   1     Order Specific Question:   Diet:     Answer:   Clear Liquid [10]       ACTIVITY  As tolerated.  Weight bearing as tolerated    CONSULTATIONS  General surgery    PROCEDURES  None    LABORATORY  Lab Results   Component Value Date    SODIUM 140 05/29/2024    POTASSIUM 3.8 05/29/2024    CHLORIDE 106 05/29/2024    CO2 23 05/29/2024    GLUCOSE 105 (H) 05/29/2024    BUN 25 (H) 05/29/2024    CREATININE 0.72 05/29/2024        Lab Results   Component Value Date    WBC 8.8 05/29/2024    HEMOGLOBIN 13.3 05/29/2024    HEMATOCRIT 39.9 05/29/2024    PLATELETCT 320 05/29/2024        Total time of the discharge process: 45 minutes.

## 2024-05-29 NOTE — PROGRESS NOTES
[facility] Internal Medicine Daily Progress Note    Date of Service  5/28/2024    [facility] Team: [facility] IM Purple Team   Attending: Haleigh Hernandez M.d.  Senior Resident: Dr. Chopra  Intern:  Dr. Lopez  Contact Number: [phone]    Chief Complaint  Bernice Vo is a 68 y.o. female admitted 5/27/2024 with abdominal pain and distention s/p robotic quintanilla's procedure    Hospital Course  Bernice Vo is a 68 y.o. female with PMH of dilated cardiomyopathy, CHF (EF 50-55% in Feb 2024), hx of arrhythmia (AICD), Asthma, and perforated diverticulitis (s/p reversal in March 2024) admitted for epigastric abdominal pain with concerns of partial bowel obstruction on hospital day 1. Per patient, she initially developed epigastric pain yesterday before lunch, proceeded to eat, but the pain persisted. She attempted to lay down, but decided to go to the ED after the pain continued. The patient endorsed some associated nausea, but no vomiting. She denied any fever, chills, dysuria, chest pain, shortness of breath, or palpitations. Her last bowel movement was yesterday morning and was normal, no hematochezia or melena. She has continued to pass gas. The patient denies any diet changes.      In the ED, the patient was hemodynamically stable. She had a WBC of 11.7 with left shift. Lipase of 31. Negative Troponin. Creatinine of 0.98. UA showed moderate leukocyte esterase. CT Scan showed dilated fluid filled small bowel loops in the lower abdomen suggesting internal hernia and possible developing closed-loop obstruction, but no perforation. CXR with minimal left pleural fluid vs pleural reactive change.     Interval Problem Update  Patient denied any abdominal pain since last night. Awake and alert. States passed gas but no bowel movement. Seen by surgery who recommended NPO and Small bowel series in AM. NGT attempt was unsuccessful.     I have discussed this patient's plan of care and discharge plan at IDT rounds today with Case Management,  Nursing, Nursing leadership, and other members of the IDT team.    Consultants/Specialty  general surgery    Code Status  Full Code    Disposition  The patient is not medically cleared for discharge to home or a post-acute facility.      I have placed the appropriate orders for post-discharge needs.    Review of Systems  Review of Systems   Constitutional:  Negative for chills, fever and weight loss.   HENT: Negative.     Eyes: Negative.    Respiratory:  Negative for cough, hemoptysis, sputum production, shortness of breath and wheezing.    Cardiovascular:  Negative for chest pain, palpitations, orthopnea and leg swelling.   Gastrointestinal:  Positive for constipation and nausea. Negative for abdominal pain, blood in stool, diarrhea, heartburn, melena and vomiting.   Genitourinary: Negative.    Musculoskeletal:  Positive for myalgias.   Skin: Negative.    Neurological: Negative.    Endo/Heme/Allergies: Negative.    Psychiatric/Behavioral: Negative.          Physical Exam  Temp:  [36 °C (96.8 °F)-36.3 °C (97.3 °F)] 36.2 °C (97.2 °F)  Pulse:  [52-91] 63  Resp:  [17-18] 17  BP: ()/(58-64) 104/58  SpO2:  [86 %-95 %] 95 %    Physical Exam  Constitutional:       Appearance: Normal appearance.   HENT:      Head: Normocephalic and atraumatic.      Nose: Nose normal.      Mouth/Throat:      Mouth: Mucous membranes are dry.   Eyes:      Extraocular Movements: Extraocular movements intact.      Conjunctiva/sclera: Conjunctivae normal.      Pupils: Pupils are equal, round, and reactive to light.   Cardiovascular:      Rate and Rhythm: Normal rate and regular rhythm.      Pulses: Normal pulses.      Heart sounds: Normal heart sounds.   Pulmonary:      Effort: Pulmonary effort is normal.      Breath sounds: Normal breath sounds. No wheezing, rhonchi or rales.   Abdominal:      General: There is distension.      Palpations: Abdomen is soft.      Tenderness: There is no abdominal tenderness. There is no guarding or rebound.    Musculoskeletal:      Cervical back: Normal range of motion and neck supple.      Right lower leg: No edema.      Left lower leg: No edema.   Skin:     General: Skin is warm and dry.      Capillary Refill: Capillary refill takes 2 to 3 seconds.   Neurological:      General: No focal deficit present.      Mental Status: She is alert and oriented to person, place, and time.   Psychiatric:         Mood and Affect: Mood normal.         Behavior: Behavior normal.         Thought Content: Thought content normal.         Judgment: Judgment normal.         Fluids    Intake/Output Summary (Last 24 hours) at 5/28/2024 1919  Last data filed at 5/28/2024 1200  Gross per 24 hour   Intake 0 ml   Output 0 ml   Net 0 ml       Laboratory  Recent Labs     05/27/24  1709 05/28/24 0224   WBC 11.7* 13.4*   RBC 4.36 4.67   HEMOGLOBIN 13.8 14.3   HEMATOCRIT 40.0 42.9   MCV 91.7 91.9   MCH 31.7 30.6   MCHC 34.5 33.3   RDW 46.1 46.2   PLATELETCT 349 365   MPV 9.5 9.7     Recent Labs     05/27/24 1709 05/28/24 0224   SODIUM 138 139   POTASSIUM 4.2 4.1   CHLORIDE 105 103   CO2 20 21   GLUCOSE 172* 146*   BUN 24* 24*   CREATININE 0.98 0.85   CALCIUM 9.1 9.4                   Imaging  CT-ABDOMEN-PELVIS WITH   Final Result      1.  Dilated fluid-filled small bowel loops in the lower abdomen suggesting internal hernia and possible developing closed-loop obstruction.   2.  No evidence of bowel perforation.         DX-CHEST-PORTABLE (1 VIEW)   Final Result      1.  Minimal LEFT pleural fluid versus pleural reactive change.   2.  Note pneumonia or overt pulmonary edema.      DX-SMALL BOWEL SERIES    (Results Pending)        Assessment/Plan  Problem Representation:    * Abdominal pain- (present on admission)  Assessment & Plan  No signs and symptoms as well as imaging findings concerning for infection  Patient with no vomiting, last bowel movement was morning prior to arrival at the ED  S/p robotic Alban's reversal closure of enterostomy with  resection and colorectal anastomosis at [facility] last March 21, 2024  Abdominal CT with findings suggestive of obstruction  Patient passed gas but no bowel movement  Unsuccessful NGT attempt   -Serial abdominal examination, no indication for antibiotics  -Surgery following following  -Keep patient n.p.o with sips and meds, SB series in AM 5/29  -Continue IVF       Abdominal distension- (present on admission)  Assessment & Plan  Distension is slightly improved  Unsuccessful NGT attempt  -Continue NPO with meds  -Continue to monitor  -Surgery following, appreciate recommendations  -Small bowel series in AM    Nausea- (present on admission)  Assessment & Plan  -Given Zofran with relief of symptoms  -QTc 432  Improved    Congestive heart failure (CHF) (HCC)- (present on admission)  Assessment & Plan  Currently euvolemic, asymptomatic, most recent echo 2/2024 with ejection fraction  of 50 to 55%  -Continue home medications  -Cautious/minimize IV fluid hydration    Glaucoma- (present on admission)  Assessment & Plan  -Continue home eye drops    Asthma- (present on admission)  Assessment & Plan  -Continue home Trelegy, Montelukast          VTE prophylaxis: heparin ppx    I have performed a physical exam and reviewed and updated ROS and Plan today (5/28/2024). In review of yesterday's note (5/27/2024), there are no changes except as documented above.

## 2024-05-29 NOTE — PROGRESS NOTES
"  DATE: 5/29/2024    Hospital Day 3 abdominal pain s/p Alban's Reversal with  in March .     INTERVAL EVENTS:  No critical events overnight.  Reports BMs and denies abdominal pain  SBFT pending.    PHYSICAL EXAMINATION:  Vital Signs: BP 98/64   Pulse 83   Temp 36.1 °C (97 °F) (Temporal)   Resp 16   Ht 1.626 m (5' 4.02\")   Wt 72.8 kg (160 lb 7.9 oz)   SpO2 90%     Abdomen is soft, non-distended and non tender.    LABORATORY VALUES:  Recent Labs     05/27/24 1709 05/28/24 0224 05/29/24  0037   WBC 11.7* 13.4* 8.8   RBC 4.36 4.67 4.28   HEMOGLOBIN 13.8 14.3 13.3   HEMATOCRIT 40.0 42.9 39.9   MCV 91.7 91.9 93.2   MCH 31.7 30.6 31.1   MCHC 34.5 33.3 33.3   RDW 46.1 46.2 46.8   PLATELETCT 349 365 320   MPV 9.5 9.7 9.8     Recent Labs     05/27/24 1709 05/28/24 0224 05/29/24  0037   SODIUM 138 139 140   POTASSIUM 4.2 4.1 3.8   CHLORIDE 105 103 106   CO2 20 21 23   GLUCOSE 172* 146* 105*   BUN 24* 24* 25*   CREATININE 0.98 0.85 0.72   CALCIUM 9.1 9.4 8.9     Recent Labs     05/27/24 1709 05/28/24 0224 05/29/24  0037   ASTSGOT 13 10* 12   ALTSGPT 10 16 12   TBILIRUBIN 0.5 0.5 0.7   ALKPHOSPHAT 86 85 67   GLOBULIN 2.5 2.7 2.5         ASSESSMENT AND PLAN:  Admitted with abdominal pain.  SBFT pending.  Recommend ambulating.          _________________________________     Coral Patricio D.N.P.    DD: 5/29/2024  9:34 AM    "

## 2024-05-29 NOTE — CARE PLAN
The patient is Stable - Low risk of patient condition declining or worsening    Shift Goals  Clinical Goals: NPO, trending labs, IVF  Patient Goals: go home  Family Goals: updates    Progress made toward(s) clinical / shift goals:    Problem: Pain - Standard  Goal: Alleviation of pain or a reduction in pain to the patient’s comfort goal  Outcome: Progressing  Note: Pt hs no c/o abdominal pain and nausea throughout the shift. Educated pt to call RN if pain occurs.     Problem: Knowledge Deficit - Standard  Goal: Patient and family/care givers will demonstrate understanding of plan of care, disease process/condition, diagnostic tests and medications  Outcome: Progressing  Note: Educated pt on the disease process, medications and treatment plan. Pt remains NPO except to sips of water with medicationss, bowel series this morning, GI surgery is following. Pt verbalized understanding of the plan of care.     Problem: Hemodynamics  Goal: Patient's hemodynamics, fluid balance and neurologic status will be stable or improve  Outcome: Progressing  Note: Pt remains alert and oriented throughout the shift. Continuous IVF per the provider's order, trending labs.       Patient is not progressing towards the following goals:

## 2024-05-29 NOTE — PROGRESS NOTES
Small bowel follow through without abnormality, recommend clear liquid diet and advancing as tolerated. No indications for surgical intervention.    Tin Che MD

## 2024-05-29 NOTE — PROGRESS NOTES
Telemetry Report:        Per [employer] from Monitor Room     SR [id number]  PVC, Trig  .16/.10/.40

## 2024-05-29 NOTE — HOSPITAL COURSE
Bernice Vo is a 68 y.o. female with PMH of dilated cardiomyopathy, CHF (EF 50-55% in Feb 2024), hx of arrhythmia (AICD), Asthma, and perforated diverticulitis (s/p reversal in March 2024) admitted for epigastric abdominal pain with concerns of partial bowel obstruction on hospital day 1. Per patient, she initially developed epigastric pain yesterday before lunch, proceeded to eat, but the pain persisted. She attempted to lay down, but decided to go to the ED after the pain continued. The patient endorsed some associated nausea, but no vomiting. She denied any fever, chills, dysuria, chest pain, shortness of breath, or palpitations. Her last bowel movement was yesterday morning and was normal, no hematochezia or melena. She has continued to pass gas. The patient denies any diet changes.      In the ED, the patient was hemodynamically stable. She had a WBC of 11.7 with left shift. Lipase of 31. Negative Troponin. Creatinine of 0.98. UA showed moderate leukocyte esterase. CT Scan showed dilated fluid filled small bowel loops in the lower abdomen suggesting internal hernia and possible developing closed-loop obstruction, but no perforation. CXR with minimal left pleural fluid vs pleural reactive change.     On day of discharge, patient denied any abdominal pain. Distension resolved. Has had 2 BM. SB series unremarkable for obstruction, lab work within normal. No surgical intervention needed. Patient tolerated snacks well.

## 2024-05-29 NOTE — DISCHARGE PLANNING
Care Transition Team Discharge Planning    Anticipated Discharge Information  Discharge Disposition: Discharged to home/self care (01)              Discharge Plan: Patient discussed in IDT rounds. Pending SBFT, if negative, will likely discharge. Anticipate no DC needs

## 2024-06-11 ENCOUNTER — APPOINTMENT (RX ONLY)
Dept: URBAN - METROPOLITAN AREA CLINIC 6 | Facility: CLINIC | Age: 69
Setting detail: DERMATOLOGY
End: 2024-06-11

## 2024-06-11 DIAGNOSIS — L82.1 OTHER SEBORRHEIC KERATOSIS: ICD-10-CM

## 2024-06-11 DIAGNOSIS — L90.5 SCAR CONDITIONS AND FIBROSIS OF SKIN: ICD-10-CM

## 2024-06-11 DIAGNOSIS — Z71.89 OTHER SPECIFIED COUNSELING: ICD-10-CM

## 2024-06-11 DIAGNOSIS — L81.4 OTHER MELANIN HYPERPIGMENTATION: ICD-10-CM

## 2024-06-11 DIAGNOSIS — D17 BENIGN LIPOMATOUS NEOPLASM: ICD-10-CM

## 2024-06-11 DIAGNOSIS — Z86.007 PERSONAL HISTORY OF IN-SITU NEOPLASM OF SKIN: ICD-10-CM | Status: STABLE

## 2024-06-11 DIAGNOSIS — D18.0 HEMANGIOMA: ICD-10-CM

## 2024-06-11 DIAGNOSIS — D22 MELANOCYTIC NEVI: ICD-10-CM

## 2024-06-11 DIAGNOSIS — Z87.2 PERSONAL HISTORY OF DISEASES OF THE SKIN AND SUBCUTANEOUS TISSUE: ICD-10-CM | Status: STABLE

## 2024-06-11 DIAGNOSIS — D485 NEOPLASM OF UNCERTAIN BEHAVIOR OF SKIN: ICD-10-CM

## 2024-06-11 PROBLEM — D48.5 NEOPLASM OF UNCERTAIN BEHAVIOR OF SKIN: Status: ACTIVE | Noted: 2024-06-11

## 2024-06-11 PROBLEM — D18.01 HEMANGIOMA OF SKIN AND SUBCUTANEOUS TISSUE: Status: ACTIVE | Noted: 2024-06-11

## 2024-06-11 PROBLEM — D23.61 OTHER BENIGN NEOPLASM OF SKIN OF RIGHT UPPER LIMB, INCLUDING SHOULDER: Status: ACTIVE | Noted: 2024-06-11

## 2024-06-11 PROBLEM — D22.71 MELANOCYTIC NEVI OF RIGHT LOWER LIMB, INCLUDING HIP: Status: ACTIVE | Noted: 2024-06-11

## 2024-06-11 PROBLEM — D17.1 BENIGN LIPOMATOUS NEOPLASM OF SKIN AND SUBCUTANEOUS TISSUE OF TRUNK: Status: ACTIVE | Noted: 2024-06-11

## 2024-06-11 PROBLEM — D22.5 MELANOCYTIC NEVI OF TRUNK: Status: ACTIVE | Noted: 2024-06-11

## 2024-06-11 PROBLEM — D17.24 BENIGN LIPOMATOUS NEOPLASM OF SKIN AND SUBCUTANEOUS TISSUE OF LEFT LEG: Status: ACTIVE | Noted: 2024-06-11

## 2024-06-11 PROCEDURE — ? SUNSCREEN TREATMENT REGIMEN

## 2024-06-11 PROCEDURE — 11102 TANGNTL BX SKIN SINGLE LES: CPT

## 2024-06-11 PROCEDURE — ? DEFER

## 2024-06-11 PROCEDURE — ? COUNSELING

## 2024-06-11 PROCEDURE — ? OBSERVATION

## 2024-06-11 PROCEDURE — 99213 OFFICE O/P EST LOW 20 MIN: CPT | Mod: 25

## 2024-06-11 PROCEDURE — ? BIOPSY BY SHAVE METHOD

## 2024-06-11 PROCEDURE — ? ADDITIONAL NOTES

## 2024-06-11 ASSESSMENT — LOCATION DETAILED DESCRIPTION DERM
LOCATION DETAILED: PERIUMBILICAL SKIN
LOCATION DETAILED: LEFT LATERAL ABDOMEN
LOCATION DETAILED: UPPER STERNUM
LOCATION DETAILED: RIGHT RADIAL DORSAL HAND
LOCATION DETAILED: LEFT SUPERIOR MEDIAL UPPER BACK
LOCATION DETAILED: RIGHT SUPERIOR UPPER BACK
LOCATION DETAILED: LEFT PROXIMAL POSTERIOR THIGH
LOCATION DETAILED: EPIGASTRIC SKIN
LOCATION DETAILED: RIGHT LATERAL PLANTAR HEEL
LOCATION DETAILED: INFERIOR THORACIC SPINE
LOCATION DETAILED: LEFT DISTAL PRETIBIAL REGION
LOCATION DETAILED: LEFT RADIAL DORSAL HAND
LOCATION DETAILED: LEFT INFERIOR FOREHEAD

## 2024-06-11 ASSESSMENT — LOCATION SIMPLE DESCRIPTION DERM
LOCATION SIMPLE: LEFT HAND
LOCATION SIMPLE: CHEST
LOCATION SIMPLE: RIGHT PLANTAR SURFACE
LOCATION SIMPLE: RIGHT HAND
LOCATION SIMPLE: ABDOMEN
LOCATION SIMPLE: LEFT UPPER BACK
LOCATION SIMPLE: RIGHT UPPER BACK
LOCATION SIMPLE: LEFT POSTERIOR THIGH
LOCATION SIMPLE: LEFT PRETIBIAL REGION
LOCATION SIMPLE: LEFT FOREHEAD
LOCATION SIMPLE: UPPER BACK

## 2024-06-11 ASSESSMENT — LOCATION ZONE DERM
LOCATION ZONE: FACE
LOCATION ZONE: FEET
LOCATION ZONE: TRUNK
LOCATION ZONE: HAND
LOCATION ZONE: LEG

## 2024-06-11 NOTE — PROCEDURE: ADDITIONAL NOTES
Detail Level: Simple
Render Risk Assessment In Note?: no
Additional Notes: Recommended Mederma or Scar Away

## 2024-12-18 ENCOUNTER — APPOINTMENT (OUTPATIENT)
Dept: URBAN - METROPOLITAN AREA CLINIC 6 | Facility: CLINIC | Age: 69
Setting detail: DERMATOLOGY
End: 2024-12-18

## 2024-12-18 DIAGNOSIS — L81.4 OTHER MELANIN HYPERPIGMENTATION: ICD-10-CM

## 2024-12-18 DIAGNOSIS — D17 BENIGN LIPOMATOUS NEOPLASM: ICD-10-CM

## 2024-12-18 DIAGNOSIS — L90.5 SCAR CONDITIONS AND FIBROSIS OF SKIN: ICD-10-CM

## 2024-12-18 DIAGNOSIS — Z87.2 PERSONAL HISTORY OF DISEASES OF THE SKIN AND SUBCUTANEOUS TISSUE: ICD-10-CM | Status: STABLE

## 2024-12-18 DIAGNOSIS — Z71.89 OTHER SPECIFIED COUNSELING: ICD-10-CM

## 2024-12-18 DIAGNOSIS — D18.0 HEMANGIOMA: ICD-10-CM

## 2024-12-18 DIAGNOSIS — D22 MELANOCYTIC NEVI: ICD-10-CM

## 2024-12-18 DIAGNOSIS — Z86.007 PERSONAL HISTORY OF IN-SITU NEOPLASM OF SKIN: ICD-10-CM | Status: STABLE

## 2024-12-18 DIAGNOSIS — L82.1 OTHER SEBORRHEIC KERATOSIS: ICD-10-CM

## 2024-12-18 PROBLEM — D18.01 HEMANGIOMA OF SKIN AND SUBCUTANEOUS TISSUE: Status: ACTIVE | Noted: 2024-12-18

## 2024-12-18 PROBLEM — D22.71 MELANOCYTIC NEVI OF RIGHT LOWER LIMB, INCLUDING HIP: Status: ACTIVE | Noted: 2024-12-18

## 2024-12-18 PROBLEM — D22.61 MELANOCYTIC NEVI OF RIGHT UPPER LIMB, INCLUDING SHOULDER: Status: ACTIVE | Noted: 2024-12-18

## 2024-12-18 PROBLEM — D23.61 OTHER BENIGN NEOPLASM OF SKIN OF RIGHT UPPER LIMB, INCLUDING SHOULDER: Status: ACTIVE | Noted: 2024-12-18

## 2024-12-18 PROBLEM — D22.5 MELANOCYTIC NEVI OF TRUNK: Status: ACTIVE | Noted: 2024-12-18

## 2024-12-18 PROBLEM — D17.1 BENIGN LIPOMATOUS NEOPLASM OF SKIN AND SUBCUTANEOUS TISSUE OF TRUNK: Status: ACTIVE | Noted: 2024-12-18

## 2024-12-18 PROBLEM — D17.24 BENIGN LIPOMATOUS NEOPLASM OF SKIN AND SUBCUTANEOUS TISSUE OF LEFT LEG: Status: ACTIVE | Noted: 2024-12-18

## 2024-12-18 PROCEDURE — 99213 OFFICE O/P EST LOW 20 MIN: CPT

## 2024-12-18 PROCEDURE — ? SUNSCREEN TREATMENT REGIMEN

## 2024-12-18 PROCEDURE — ? ADDITIONAL NOTES

## 2024-12-18 PROCEDURE — ? COUNSELING

## 2024-12-18 PROCEDURE — ? OBSERVATION

## 2024-12-18 PROCEDURE — ? DEFER

## 2024-12-18 ASSESSMENT — LOCATION SIMPLE DESCRIPTION DERM
LOCATION SIMPLE: LEFT HAND
LOCATION SIMPLE: RIGHT UPPER BACK
LOCATION SIMPLE: RIGHT HAND
LOCATION SIMPLE: RIGHT POSTERIOR UPPER ARM
LOCATION SIMPLE: LEFT POSTERIOR THIGH
LOCATION SIMPLE: LEFT FOREHEAD
LOCATION SIMPLE: ABDOMEN
LOCATION SIMPLE: UPPER BACK
LOCATION SIMPLE: RIGHT PLANTAR SURFACE
LOCATION SIMPLE: LEFT UPPER BACK
LOCATION SIMPLE: CHEST

## 2024-12-18 ASSESSMENT — LOCATION ZONE DERM
LOCATION ZONE: TRUNK
LOCATION ZONE: HAND
LOCATION ZONE: FEET
LOCATION ZONE: LEG
LOCATION ZONE: ARM
LOCATION ZONE: FACE

## 2024-12-18 ASSESSMENT — LOCATION DETAILED DESCRIPTION DERM
LOCATION DETAILED: PERIUMBILICAL SKIN
LOCATION DETAILED: RIGHT RADIAL DORSAL HAND
LOCATION DETAILED: INFERIOR THORACIC SPINE
LOCATION DETAILED: LEFT LATERAL ABDOMEN
LOCATION DETAILED: RIGHT PROXIMAL POSTERIOR UPPER ARM
LOCATION DETAILED: RIGHT LATERAL PLANTAR HEEL
LOCATION DETAILED: LEFT INFERIOR FOREHEAD
LOCATION DETAILED: LEFT SUPERIOR MEDIAL UPPER BACK
LOCATION DETAILED: EPIGASTRIC SKIN
LOCATION DETAILED: LEFT PROXIMAL POSTERIOR THIGH
LOCATION DETAILED: MIDDLE STERNUM
LOCATION DETAILED: LEFT RADIAL DORSAL HAND
LOCATION DETAILED: RIGHT SUPERIOR UPPER BACK

## 2025-07-02 ENCOUNTER — APPOINTMENT (OUTPATIENT)
Dept: URBAN - METROPOLITAN AREA CLINIC 6 | Facility: CLINIC | Age: 70
Setting detail: DERMATOLOGY
End: 2025-07-02

## 2025-07-02 DIAGNOSIS — D22 MELANOCYTIC NEVI: ICD-10-CM

## 2025-07-02 DIAGNOSIS — D18.0 HEMANGIOMA: ICD-10-CM

## 2025-07-02 DIAGNOSIS — L81.4 OTHER MELANIN HYPERPIGMENTATION: ICD-10-CM

## 2025-07-02 DIAGNOSIS — L82.0 INFLAMED SEBORRHEIC KERATOSIS: ICD-10-CM

## 2025-07-02 DIAGNOSIS — Z87.2 PERSONAL HISTORY OF DISEASES OF THE SKIN AND SUBCUTANEOUS TISSUE: ICD-10-CM

## 2025-07-02 DIAGNOSIS — Z86.007 PERSONAL HISTORY OF IN-SITU NEOPLASM OF SKIN: ICD-10-CM

## 2025-07-02 DIAGNOSIS — L30.0 NUMMULAR DERMATITIS: ICD-10-CM

## 2025-07-02 DIAGNOSIS — L82.1 OTHER SEBORRHEIC KERATOSIS: ICD-10-CM

## 2025-07-02 DIAGNOSIS — Z71.89 OTHER SPECIFIED COUNSELING: ICD-10-CM

## 2025-07-02 PROBLEM — D18.01 HEMANGIOMA OF SKIN AND SUBCUTANEOUS TISSUE: Status: ACTIVE | Noted: 2025-07-02

## 2025-07-02 PROBLEM — D22.61 MELANOCYTIC NEVI OF RIGHT UPPER LIMB, INCLUDING SHOULDER: Status: ACTIVE | Noted: 2025-07-02

## 2025-07-02 PROBLEM — D22.5 MELANOCYTIC NEVI OF TRUNK: Status: ACTIVE | Noted: 2025-07-02

## 2025-07-02 PROCEDURE — ? COUNSELING

## 2025-07-02 PROCEDURE — ? SUNSCREEN RECOMMENDATIONS

## 2025-07-02 PROCEDURE — ? LIQUID NITROGEN

## 2025-07-02 PROCEDURE — ? PRESCRIPTION

## 2025-07-02 PROCEDURE — ? TREATMENT REGIMEN

## 2025-07-02 PROCEDURE — ? OBSERVATION

## 2025-07-02 RX ORDER — TRIAMCINOLONE ACETONIDE 1 MG/G
1 OINTMENT TOPICAL BID
Qty: 30 | Refills: 3 | Status: ERX | COMMUNITY
Start: 2025-07-02

## 2025-07-02 RX ADMIN — TRIAMCINOLONE ACETONIDE 1: 1 OINTMENT TOPICAL at 00:00

## 2025-07-02 ASSESSMENT — LOCATION SIMPLE DESCRIPTION DERM
LOCATION SIMPLE: LEFT CHEEK
LOCATION SIMPLE: LEFT UPPER BACK
LOCATION SIMPLE: ABDOMEN
LOCATION SIMPLE: RIGHT POSTERIOR UPPER ARM
LOCATION SIMPLE: NOSE
LOCATION SIMPLE: LEFT ANTERIOR NECK
LOCATION SIMPLE: LEFT PRETIBIAL REGION
LOCATION SIMPLE: CHEST
LOCATION SIMPLE: RIGHT PRETIBIAL REGION
LOCATION SIMPLE: RIGHT UPPER BACK
LOCATION SIMPLE: UPPER BACK

## 2025-07-02 ASSESSMENT — LOCATION DETAILED DESCRIPTION DERM
LOCATION DETAILED: MIDDLE STERNUM
LOCATION DETAILED: RIGHT PROXIMAL POSTERIOR UPPER ARM
LOCATION DETAILED: INFERIOR THORACIC SPINE
LOCATION DETAILED: LEFT MEDIAL MALAR CHEEK
LOCATION DETAILED: LEFT PROXIMAL PRETIBIAL REGION
LOCATION DETAILED: RIGHT PROXIMAL PRETIBIAL REGION
LOCATION DETAILED: EPIGASTRIC SKIN
LOCATION DETAILED: PERIUMBILICAL SKIN
LOCATION DETAILED: RIGHT SUPERIOR UPPER BACK
LOCATION DETAILED: LEFT CLAVICULAR NECK
LOCATION DETAILED: NASAL ROOT
LOCATION DETAILED: LEFT SUPERIOR MEDIAL UPPER BACK

## 2025-07-02 ASSESSMENT — LOCATION ZONE DERM
LOCATION ZONE: TRUNK
LOCATION ZONE: ARM
LOCATION ZONE: LEG
LOCATION ZONE: NECK
LOCATION ZONE: NOSE
LOCATION ZONE: FACE

## 2025-07-02 NOTE — PROCEDURE: TREATMENT REGIMEN
Otc Regimen: Recommended daily moisturizing, recommended CeraVe
Detail Level: Zone
Initiate Treatment: triamcinolone acetonide 0.1 % topical ointment Bid

## 2025-07-02 NOTE — PROCEDURE: LIQUID NITROGEN
Add 52 Modifier (Optional): no
Detail Level: Detailed
Medical Necessity Clause: This procedure was medically necessary because the lesions that were treated were:
Show Aperture Variable?: Yes
Medical Necessity Information: It is in your best interest to select a reason for this procedure from the list below. All of these items fulfill various CMS LCD requirements except the new and changing color options.
Consent: The patient's consent was obtained including but not limited to risks of crusting, scabbing, blistering, scarring, darker or lighter pigmentary change, recurrence, incomplete removal and infection.
Post-Care Instructions: I reviewed with the patient in detail post-care instructions. Patient is to wear sunprotection, and avoid picking at any of the treated lesions. Pt may apply Vaseline to crusted or scabbing areas.
Number Of Freeze-Thaw Cycles: 3 freeze-thaw cycles
Spray Paint Text: The liquid nitrogen was applied to the skin utilizing a spray paint frosting technique.
Duration Of Freeze Thaw-Cycle (Seconds): 10-15